# Patient Record
Sex: FEMALE | Race: WHITE | NOT HISPANIC OR LATINO | Employment: UNEMPLOYED | URBAN - NONMETROPOLITAN AREA
[De-identification: names, ages, dates, MRNs, and addresses within clinical notes are randomized per-mention and may not be internally consistent; named-entity substitution may affect disease eponyms.]

---

## 2021-01-09 ENCOUNTER — HOSPITAL ENCOUNTER (EMERGENCY)
Facility: OTHER | Age: 31
Discharge: HOME | End: 2021-01-09
Attending: STUDENT IN AN ORGANIZED HEALTH CARE EDUCATION/TRAINING PROGRAM | Admitting: STUDENT IN AN ORGANIZED HEALTH CARE EDUCATION/TRAINING PROGRAM
Payer: COMMERCIAL

## 2021-01-09 ENCOUNTER — APPOINTMENT (OUTPATIENT)
Dept: CT IMAGING | Facility: OTHER | Age: 31
End: 2021-01-09
Attending: STUDENT IN AN ORGANIZED HEALTH CARE EDUCATION/TRAINING PROGRAM
Payer: COMMERCIAL

## 2021-01-09 VITALS
HEART RATE: 78 BPM | DIASTOLIC BLOOD PRESSURE: 82 MMHG | WEIGHT: 165.4 LBS | HEIGHT: 67 IN | BODY MASS INDEX: 25.96 KG/M2 | OXYGEN SATURATION: 99 % | TEMPERATURE: 98.5 F | SYSTOLIC BLOOD PRESSURE: 127 MMHG | RESPIRATION RATE: 18 BRPM

## 2021-01-09 DIAGNOSIS — K04.7 DENTAL INFECTION: ICD-10-CM

## 2021-01-09 DIAGNOSIS — R22.0 FACIAL SWELLING: ICD-10-CM

## 2021-01-09 LAB
ANION GAP SERPL CALCULATED.3IONS-SCNC: 5 MMOL/L (ref 3–14)
BASOPHILS # BLD AUTO: 0 10E9/L (ref 0–0.2)
BASOPHILS NFR BLD AUTO: 0.4 %
BUN SERPL-MCNC: 13 MG/DL (ref 7–25)
CALCIUM SERPL-MCNC: 9 MG/DL (ref 8.6–10.3)
CHLORIDE SERPL-SCNC: 103 MMOL/L (ref 98–107)
CO2 SERPL-SCNC: 27 MMOL/L (ref 21–31)
CREAT SERPL-MCNC: 0.63 MG/DL (ref 0.6–1.2)
CRP SERPL-MCNC: 12.3 MG/L
DIFFERENTIAL METHOD BLD: NORMAL
EOSINOPHIL # BLD AUTO: 0.1 10E9/L (ref 0–0.7)
EOSINOPHIL NFR BLD AUTO: 0.9 %
ERYTHROCYTE [DISTWIDTH] IN BLOOD BY AUTOMATED COUNT: 12.3 % (ref 10–15)
GFR SERPL CREATININE-BSD FRML MDRD: >90 ML/MIN/{1.73_M2}
GLUCOSE SERPL-MCNC: 103 MG/DL (ref 70–105)
HCT VFR BLD AUTO: 39.2 % (ref 35–47)
HGB BLD-MCNC: 12.8 G/DL (ref 11.7–15.7)
IMM GRANULOCYTES # BLD: 0 10E9/L (ref 0–0.4)
IMM GRANULOCYTES NFR BLD: 0.3 %
LYMPHOCYTES # BLD AUTO: 2.1 10E9/L (ref 0.8–5.3)
LYMPHOCYTES NFR BLD AUTO: 29.3 %
MCH RBC QN AUTO: 28.6 PG (ref 26.5–33)
MCHC RBC AUTO-ENTMCNC: 32.7 G/DL (ref 31.5–36.5)
MCV RBC AUTO: 88 FL (ref 78–100)
MONOCYTES # BLD AUTO: 0.5 10E9/L (ref 0–1.3)
MONOCYTES NFR BLD AUTO: 7.4 %
NEUTROPHILS # BLD AUTO: 4.3 10E9/L (ref 1.6–8.3)
NEUTROPHILS NFR BLD AUTO: 61.7 %
PLATELET # BLD AUTO: 198 10E9/L (ref 150–450)
POTASSIUM SERPL-SCNC: 4.1 MMOL/L (ref 3.5–5.1)
RBC # BLD AUTO: 4.47 10E12/L (ref 3.8–5.2)
SODIUM SERPL-SCNC: 135 MMOL/L (ref 134–144)
WBC # BLD AUTO: 7 10E9/L (ref 4–11)

## 2021-01-09 PROCEDURE — 86140 C-REACTIVE PROTEIN: CPT | Performed by: STUDENT IN AN ORGANIZED HEALTH CARE EDUCATION/TRAINING PROGRAM

## 2021-01-09 PROCEDURE — 36415 COLL VENOUS BLD VENIPUNCTURE: CPT | Performed by: STUDENT IN AN ORGANIZED HEALTH CARE EDUCATION/TRAINING PROGRAM

## 2021-01-09 PROCEDURE — 99285 EMERGENCY DEPT VISIT HI MDM: CPT | Mod: 25 | Performed by: STUDENT IN AN ORGANIZED HEALTH CARE EDUCATION/TRAINING PROGRAM

## 2021-01-09 PROCEDURE — 70487 CT MAXILLOFACIAL W/DYE: CPT

## 2021-01-09 PROCEDURE — 85025 COMPLETE CBC W/AUTO DIFF WBC: CPT | Performed by: STUDENT IN AN ORGANIZED HEALTH CARE EDUCATION/TRAINING PROGRAM

## 2021-01-09 PROCEDURE — 250N000013 HC RX MED GY IP 250 OP 250 PS 637: Performed by: STUDENT IN AN ORGANIZED HEALTH CARE EDUCATION/TRAINING PROGRAM

## 2021-01-09 PROCEDURE — 99283 EMERGENCY DEPT VISIT LOW MDM: CPT | Performed by: STUDENT IN AN ORGANIZED HEALTH CARE EDUCATION/TRAINING PROGRAM

## 2021-01-09 PROCEDURE — 255N000002 HC RX 255 OP 636: Performed by: STUDENT IN AN ORGANIZED HEALTH CARE EDUCATION/TRAINING PROGRAM

## 2021-01-09 PROCEDURE — 80048 BASIC METABOLIC PNL TOTAL CA: CPT | Performed by: STUDENT IN AN ORGANIZED HEALTH CARE EDUCATION/TRAINING PROGRAM

## 2021-01-09 RX ORDER — PENICILLIN V POTASSIUM 500 MG/1
500 TABLET, FILM COATED ORAL 4 TIMES DAILY
Qty: 40 TABLET | Refills: 0 | Status: SHIPPED | OUTPATIENT
Start: 2021-01-09 | End: 2021-01-19

## 2021-01-09 RX ORDER — ACETAMINOPHEN 325 MG/1
975 TABLET ORAL ONCE
Status: COMPLETED | OUTPATIENT
Start: 2021-01-09 | End: 2021-01-09

## 2021-01-09 RX ORDER — IBUPROFEN 400 MG/1
400 TABLET, FILM COATED ORAL ONCE
Status: COMPLETED | OUTPATIENT
Start: 2021-01-09 | End: 2021-01-09

## 2021-01-09 RX ADMIN — IBUPROFEN 400 MG: 200 TABLET, FILM COATED ORAL at 10:45

## 2021-01-09 RX ADMIN — IOHEXOL 100 ML: 350 INJECTION, SOLUTION INTRAVENOUS at 11:36

## 2021-01-09 RX ADMIN — ACETAMINOPHEN 975 MG: 325 TABLET, FILM COATED ORAL at 10:46

## 2021-01-09 ASSESSMENT — MIFFLIN-ST. JEOR: SCORE: 1507.88

## 2021-01-09 NOTE — ED AVS SNAPSHOT
[facility]  [address]  [place] [address]  Phone: [phone]  Fax: [phone]                                    Genet Jaimes   MRN: [id number]    Department: [facility]   Date of Visit: 1/9/2021           After Visit Summary Signature Page    I have received my discharge instructions, and my questions have been answered. I have discussed any challenges I see with this plan with the nurse or doctor.    ..........................................................................................................................................  Patient/Patient Representative Signature      ..........................................................................................................................................  Patient Representative Print Name and Relationship to Patient    ..................................................               ................................................  Date                                   Time    ..........................................................................................................................................  Reviewed by Signature/Title    ...................................................              ..............................................  Date                                               Time          22EPIC Rev 08/18

## 2021-01-09 NOTE — ED TRIAGE NOTES
Pt presents to the ED ambulatory with a chief complaint of left mouth pain. Pt had a root canal on the left side and had a temporary filling placed in March. Pt's left side of face is swollen. Pt says it is not painful at this time but if she opens her mouth wide it is painful.  Pt did take Ibuprofen yesterday and helped decrease pain. Pt says she has noticed drainage from the site.

## 2021-01-09 NOTE — ED PROVIDER NOTES
"  History     Chief Complaint   Patient presents with     Dental Pain     HPI  Genet Jaimes is a 30 year old female with history of root canal of tooth #16 (left upper 2nd from posterior molar) in 2020 who presents with worsening left upper tooth pain over the past 2-3 days and now facial swelling of her left maxillary face starting 1-2 days ago. She reports possible purulent drainage yesterday but none today. Pain worse with opening her mouth wide, otherwise tolerable. No fevers/chills, nausea/vomiting. No pain with looking side to side or up/down. Pain extends near her anterior left ear and near her jaw but no to her neck. She denies difficulty breathing or sore throat or pain with swallowing. She does not have an appointment to see a dentist at this time.    Allergies:  Allergies   Allergen Reactions     Red Dye Itching       Problem List:    There are no active problems to display for this patient.       Past Medical History:    History reviewed. No pertinent past medical history.    Past Surgical History:    History reviewed. No pertinent surgical history.    Family History:    History reviewed. No pertinent family history.    Social History:  Marital Status:   [2]  Social History     Tobacco Use     Smoking status: Former Smoker     Quit date: 2016     Years since quittin.0     Smokeless tobacco: Never Used   Substance Use Topics     Alcohol use: None     Drug use: None        Medications:         penicillin V (VEETID) 500 MG tablet          Review of Systems  10-point ROS complete and negative other than as noted in HPI above.    Physical Exam   BP: 134/80  Pulse: 87  Temp: 98.5  F (36.9  C)  Resp: 18  Height: 170.2 cm (5' 7\")  Weight: 75 kg (165 lb 6.4 oz)  SpO2: 100 %      Physical Exam  Gen: Lying in bed, no acute distress, alert  HEENT: NC/AT. Soft tissue swelling without overlying erythema to left midface, no fluctuance. TTP to tooth #16 in left upper mouth, mild gingival " irritation, no purulent or other drainage.  Oropharynx clear, no TTP or swelling about the neck or jaw or floor of the mouth  CV: RRR, appears warm and well-perfused  Pulm: CTAB, normal respiratory effort  MSK: No gross deformities or swelling  Skin: No erythema, rash, or other lesions  Neuro: A&O x4, no focal deficits, CN II-XII grossly intact, EOMI without diplopia, PERRL    ED Course     ED Course as of Yared 10 0800   Sat Jan 09, 2021   1019 Patient evaluated, plan for tylenol and ibuprofen, facial CT      1050 CBC unremarkable, normal white count      1100 BMP unremarkable and CRP minimally elevated to 12.3      1205 CT with no abscess, soft tissue swelling. Plan for discharge with penicillin and close outpatient dental follow-up, careful return precautions      1219 Patient re-evaluated, discussed results and plan for antibiotic and discharge with close dental follow-up        Procedures               Critical Care time:  none               Results for orders placed or performed during the hospital encounter of 01/09/21 (from the past 24 hour(s))   CBC with platelets differential   Result Value Ref Range    WBC 7.0 4.0 - 11.0 10e9/L    RBC Count 4.47 3.8 - 5.2 10e12/L    Hemoglobin 12.8 11.7 - 15.7 g/dL    Hematocrit 39.2 35.0 - 47.0 %    MCV 88 78 - 100 fl    MCH 28.6 26.5 - 33.0 pg    MCHC 32.7 31.5 - 36.5 g/dL    RDW 12.3 10.0 - 15.0 %    Platelet Count 198 150 - 450 10e9/L    Diff Method Automated Method     % Neutrophils 61.7 %    % Lymphocytes 29.3 %    % Monocytes 7.4 %    % Eosinophils 0.9 %    % Basophils 0.4 %    % Immature Granulocytes 0.3 %    Absolute Neutrophil 4.3 1.6 - 8.3 10e9/L    Absolute Lymphocytes 2.1 0.8 - 5.3 10e9/L    Absolute Monocytes 0.5 0.0 - 1.3 10e9/L    Absolute Eosinophils 0.1 0.0 - 0.7 10e9/L    Absolute Basophils 0.0 0.0 - 0.2 10e9/L    Abs Immature Granulocytes 0.0 0 - 0.4 10e9/L   Basic metabolic panel   Result Value Ref Range    Sodium 135 134 - 144 mmol/L    Potassium 4.1  3.5 - 5.1 mmol/L    Chloride 103 98 - 107 mmol/L    Carbon Dioxide 27 21 - 31 mmol/L    Anion Gap 5 3 - 14 mmol/L    Glucose 103 70 - 105 mg/dL    Urea Nitrogen 13 7 - 25 mg/dL    Creatinine 0.63 0.60 - 1.20 mg/dL    GFR Estimate >90 >60 mL/min/[1.73_m2]    GFR Estimate If Black >90 >60 mL/min/[1.73_m2]    Calcium 9.0 8.6 - 10.3 mg/dL   CRP inflammation   Result Value Ref Range    CRP Inflammation 12.3 (H) <10.0 mg/L   CT Facial Bones with Contrast    Narrative    PROCEDURE: CT FACIAL BONES WITH CONTRAST 1/9/2021 11:37 AM    HISTORY: Mass, lump or swelling, maxface; left midface swelling,  concern for abscess    COMPARISONS: None.    Meds/Dose Given: omnipaque 350 100 ml    TECHNIQUE: CT scan of the facial bones with IV contrast. Sagittal  coronal reconstructions were obtained    FINDINGS: The mandible is intact. No dental abscesses a root canal as  been performed on a maxillary molar on the left. There is moderate  mucosal thickening seen in the left maxillary sinus. The right  maxillary both ethmoids both sphenoid and frontal sinuses are clear.  The globes extraocular muscles and optic nerves appear normal. The  bony orbits are intact. The nasal bones and nasal septum appear  normal. The turbinates are normal. Pterygoid plates appear normal.    There is soft tissue swelling seen in the left cheek. No soft tissue  abscess is seen.         Impression    IMPRESSION: Soft tissue swelling in the left cheek no soft tissue  abscess is seen. Moderate mucosal thickening seen in the left  maxillary sinus    FOZIA BRAVO MD       Medications   acetaminophen (TYLENOL) tablet 975 mg (975 mg Oral Given 1/9/21 1046)   ibuprofen (ADVIL/MOTRIN) tablet 400 mg (400 mg Oral Given 1/9/21 1045)   iohexol (OMNIPAQUE) 350 mg/mL solution 100 mL (100 mLs Intravenous Given 1/9/21 1136)       Assessments & Plan (with Medical Decision Making)   30-year-old woman with history of root canal of tooth #16 in March 2020 who presents with left  midface swelling and pain of tooth #16 starting within the last 2-3 days, but primarily worsening swelling since yesterday. Vitally stable, afebrile here. DDx includes dental abscess vs infection, dental caries, less likely cellulitis, erysipelas, necrotizing fasciitis. Examined as above, lower suspicion for drainable abscess but given degree of midface swelling a CT of the face was obtained to rule this out; soft tissue swelling but no identifiable facial swelling noted. No concern for posterior or neck extension thus CT neck not indicated. Her CRP was minimally elevated, white count normal without left-shift, making significant bacterial infection less likely. She received tylenol and ibuprofen for pain with some relief. On recheck I discussed that she likely has a periapical abscess that will need definitive management by a dentist; patient denies any antibiotic allergies so will start penicillin for a 10-day course and discharge with close outpatient dental follow-up as soon as possible in the next 2 days. Tylenol/ibuprofen as needed for pain. Careful ED return precautions provided.    I have reviewed the nursing notes.    I have reviewed the findings, diagnosis, plan and need for follow up with the patient.       Discharge Medication List as of 1/9/2021 12:37 PM      START taking these medications    Details   penicillin V (VEETID) 500 MG tablet Take 1 tablet (500 mg) by mouth 4 times daily for 10 days, Disp-40 tablet, R-0, E-Prescribe             Final diagnoses:   Dental infection   Facial swelling       1/9/2021   [facility]     [facility] Jason Jo MD  01/10/21 0805

## 2021-01-09 NOTE — DISCHARGE INSTRUCTIONS
Take tylenol and ibuprofen as needed for pain control. These medications can be taken together or alternating; a typical regimen would include tylenol 1000 mg three times daily as needed (not to exceed 3000 mg in 24 hour period) and ibuprofen 400 mg every 6 hours as needed (take ibuprofen 30 minutes after eating to limit stomach upset).    Take penicillin (antibiotic) as prescribed for dental infection; continue taking until gone even if feeling better.    Follow up with your dentist as soon as possible for definitive management.    Come back to the emergency department immediately or call 911 if you develop a fever greater than 101 degrees Fahrenheit, vomiting or new headache, worsening swelling or redness of the face, or are otherwise feeling sicker.

## 2023-02-09 ENCOUNTER — VIRTUAL VISIT (OUTPATIENT)
Dept: OBGYN | Facility: OTHER | Age: 33
End: 2023-02-09
Attending: STUDENT IN AN ORGANIZED HEALTH CARE EDUCATION/TRAINING PROGRAM
Payer: COMMERCIAL

## 2023-02-09 DIAGNOSIS — O36.80X0 ENCOUNTER TO DETERMINE FETAL VIABILITY OF PREGNANCY, SINGLE OR UNSPECIFIED FETUS: Primary | ICD-10-CM

## 2023-02-09 PROCEDURE — 99207 PR OB VISIT-NO CHARGE - GICH ONLY: CPT

## 2023-02-09 RX ORDER — PRENATAL VIT/IRON FUM/FOLIC AC 27MG-0.8MG
1 TABLET ORAL DAILY
COMMUNITY

## 2023-02-09 ASSESSMENT — PATIENT HEALTH QUESTIONNAIRE - PHQ9: SUM OF ALL RESPONSES TO PHQ QUESTIONS 1-9: 6

## 2023-02-09 NOTE — PROGRESS NOTES
Verbal consent obtained for telephone visit. Total length of call: 27 min    HPI:    This is a 32 year old female patient,  who was called today for OB Intake visit. Patient reports positive pregnancy test at home.     Obstetrical history and OB Demographics updated to the best of this nurse's ability based on patient report. PHQ-9 depression screening and routine Domestic Abuse screening completed. All immediate questions and concerns answered.    FOOD SECURITY SCREENING QUESTIONS  Hunger Vital Signs:  Within the past 12 months we worried whether our food would run out before we got money to buy more. Never  Within the past 12 months the food we bought just didn't last and we didn't have money to get more. Never    Last menstrual period is reported as Patient's last menstrual period was 2022 (approximate). YVONNE based on LMP is Estimated Date of Delivery: Sep 13, 2023.  Her cycles are irregular.  Her last menstrual period was normal.   Since her LMP, she has experienced  nausea, emesis, fatigue, headache, loss of appetite, urinary urgency and urinary frequency.       OBSTETRIC HISTORY:    OB History    Para Term  AB Living   3 2 2 0 0 2   SAB IAB Ectopic Multiple Live Births   0 0 0 0 2      # Outcome Date GA Lbr Dion/2nd Weight Sex Delivery Anes PTL Lv   3 Current            2 Term 10/17/19   2.948 kg (6 lb 8 oz) M Vag-Spont   BRANT      Name: Chano   1 Term 10/24/11    M Vag-Spont   BRANT      Name: Silvio       Age of first pregnancy: 20  Previous OB Provider: Dr. Albright   Previous Delivering Clinic: [facility]   Release of Records: Patient will have records sent to us.     Current delivery plan: [place]   Preferred OB Provider: J[facility]O  Current Primary Care Provider: Doesn't have one at this time.   Pediatrician: Dr. Sweet    Additional History: Had a reaction to witch hazel in the past and has to be stitched in the past.      Have you travelled during the pregnancy?No  Have your  sexual partner(s) travelled during the pregnancy?No      HISTORY:   Planned Pregnancy: Yes  Marital Status:   Occupation: Stay at home mom   Living in Household: Spouse and Children    Father of the baby is involved.   Family and father of baby is supportive of current pregnancy.  Past Medical History of Father of Baby:No significant medical history    Past History:  Her past medical history No past medical history on file..      Her past surgical history: No past surgical history on file.    Since her last LMP she admits to the use of Alcohol .    Pap smear history: Patient is due. Last pap was in 2019 per patient.     STD/STI history: No STD history    STD/STI symptoms: discharge     Past medical, surgical, social and family history were reviewed and updated in EPIC.    Medications reviewed by this nurse. Current medication list:  Current Outpatient Medications   Medication Sig Dispense Refill     Cyanocobalamin (B-12) 1000 MCG TBCR        Prenatal Vit-Fe Fumarate-FA (PRENATAL MULTIVITAMIN W/IRON) 27-0.8 MG tablet Take 1 tablet by mouth daily       The following medications were recommended to be discontinued due to Pregnancy Category D status: None  Patient informed to contact her primary care provider as soon as possible to discuss a safer alternative.    Risk factors:  Moderate and moderately severe risks (consult with OB/Gyn)  Previous fetal or  demise: No  History of  delivery: No  History of heart disease Class I: No  Severe anemia, unresponsive to iron therapy: No  Pelvic mass or neoplasm: No  Previous : No  Hyper/hypothyroidism: No  History of postpartum hemorrhage requiring transfusion:No  History of Placenta Accreta: No    High Risk (Pregnancy managed by OB/Gyn)  Multiple pregnancy: No  Pre-gestational diabetes: No  Chronic Hypertension: No  Renal Failure: Yes  Heart disease, class II or greater: No  Rh Isoimmunization: No  Chronic active hepatitis: No  Convulsive disorder,  poorly controlled: No  Isoimmune thrombocytopenia: No  Pre-term premature rupture of membranes: No  Lupus or other autoimmune disorder: No  Human Immunodeficiency Virus: No      ASSESSMENT/PLAN:       ICD-10-CM    1. Encounter to determine fetal viability of pregnancy, single or unspecified fetus  O36.80X0  OB <14 Weeks w Transvaginal Single          32 year old , 9w1d of pregnancy with YVONNE of 2023, by Last Menstrual Period    Per standing orders and scope of practice of this nurse, patient will have the following orders placed and completed prior to initial OB visit with the appropriate provider:    --early ultrasound for dating and viability ordered for 6+ weeks gestation based on LMP    --Quantitative Beta HCG and progesterone monitoring if indicated    Counseling given:     - Recommended weight gain for pregnancy: 15-25 lbs.   BMI < 18.5  28-40 lbs   18.5 - 24.9 25-35   25 - 29.9 15-25   > 30  < 15       PLAN/PATIENT INSTRUCTIONS:    Normal exercise.  Normal sexual activity.  Prenatal vitamins.  Anticipated weight gain.    follow-up appointment with Dr. Bello for pre-selma care and take multivitamin or pre-selma vitamins    Michaelle Huang RN.................................................. 2023 8:07 AM

## 2023-02-22 LAB
ABO/RH(D): NORMAL
ANTIBODY SCREEN: NEGATIVE
SPECIMEN EXPIRATION DATE: NORMAL

## 2023-02-23 ENCOUNTER — HOSPITAL ENCOUNTER (OUTPATIENT)
Dept: ULTRASOUND IMAGING | Facility: OTHER | Age: 33
Discharge: HOME | End: 2023-02-23
Attending: STUDENT IN AN ORGANIZED HEALTH CARE EDUCATION/TRAINING PROGRAM
Payer: COMMERCIAL

## 2023-02-23 ENCOUNTER — PRENATAL OFFICE VISIT (OUTPATIENT)
Dept: OBGYN | Facility: OTHER | Age: 33
End: 2023-02-23
Attending: STUDENT IN AN ORGANIZED HEALTH CARE EDUCATION/TRAINING PROGRAM
Payer: COMMERCIAL

## 2023-02-23 VITALS
HEART RATE: 79 BPM | SYSTOLIC BLOOD PRESSURE: 110 MMHG | OXYGEN SATURATION: 99 % | WEIGHT: 166.5 LBS | BODY MASS INDEX: 26.13 KG/M2 | RESPIRATION RATE: 16 BRPM | HEIGHT: 67 IN | DIASTOLIC BLOOD PRESSURE: 60 MMHG

## 2023-02-23 DIAGNOSIS — Z34.91 ENCOUNTER FOR PREGNANCY RELATED EXAMINATION IN FIRST TRIMESTER: Primary | ICD-10-CM

## 2023-02-23 DIAGNOSIS — O36.80X0 ENCOUNTER TO DETERMINE FETAL VIABILITY OF PREGNANCY, SINGLE OR UNSPECIFIED FETUS: ICD-10-CM

## 2023-02-23 DIAGNOSIS — Z34.92 SECOND TRIMESTER PREGNANCY: ICD-10-CM

## 2023-02-23 DIAGNOSIS — O21.9 NAUSEA AND VOMITING IN PREGNANCY: ICD-10-CM

## 2023-02-23 LAB
BASOPHILS # BLD AUTO: 0 10E3/UL (ref 0–0.2)
BASOPHILS NFR BLD AUTO: 0 %
C TRACH DNA SPEC QL PROBE+SIG AMP: NEGATIVE
EOSINOPHIL # BLD AUTO: 0 10E3/UL (ref 0–0.7)
EOSINOPHIL NFR BLD AUTO: 0 %
ERYTHROCYTE [DISTWIDTH] IN BLOOD BY AUTOMATED COUNT: 11.7 % (ref 10–15)
HCT VFR BLD AUTO: 35 % (ref 35–47)
HGB BLD-MCNC: 12.3 G/DL (ref 11.7–15.7)
IMM GRANULOCYTES # BLD: 0.1 10E3/UL
IMM GRANULOCYTES NFR BLD: 1 %
LYMPHOCYTES # BLD AUTO: 2.5 10E3/UL (ref 0.8–5.3)
LYMPHOCYTES NFR BLD AUTO: 26 %
MCH RBC QN AUTO: 29.9 PG (ref 26.5–33)
MCHC RBC AUTO-ENTMCNC: 35.1 G/DL (ref 31.5–36.5)
MCV RBC AUTO: 85 FL (ref 78–100)
MONOCYTES # BLD AUTO: 0.4 10E3/UL (ref 0–1.3)
MONOCYTES NFR BLD AUTO: 4 %
N GONORRHOEA DNA SPEC QL NAA+PROBE: NEGATIVE
NEUTROPHILS # BLD AUTO: 6.7 10E3/UL (ref 1.6–8.3)
NEUTROPHILS NFR BLD AUTO: 69 %
NRBC # BLD AUTO: 0 10E3/UL
NRBC BLD AUTO-RTO: 0 /100
PLATELET # BLD AUTO: 208 10E3/UL (ref 150–450)
RBC # BLD AUTO: 4.12 10E6/UL (ref 3.8–5.2)
WBC # BLD AUTO: 9.8 10E3/UL (ref 4–11)

## 2023-02-23 PROCEDURE — G0123 SCREEN CERV/VAG THIN LAYER: HCPCS | Performed by: STUDENT IN AN ORGANIZED HEALTH CARE EDUCATION/TRAINING PROGRAM

## 2023-02-23 PROCEDURE — 87591 N.GONORRHOEAE DNA AMP PROB: CPT | Mod: ZL | Performed by: STUDENT IN AN ORGANIZED HEALTH CARE EDUCATION/TRAINING PROGRAM

## 2023-02-23 PROCEDURE — 86901 BLOOD TYPING SEROLOGIC RH(D): CPT | Mod: ZL | Performed by: STUDENT IN AN ORGANIZED HEALTH CARE EDUCATION/TRAINING PROGRAM

## 2023-02-23 PROCEDURE — 85025 COMPLETE CBC W/AUTO DIFF WBC: CPT | Mod: ZL | Performed by: STUDENT IN AN ORGANIZED HEALTH CARE EDUCATION/TRAINING PROGRAM

## 2023-02-23 PROCEDURE — 76801 OB US < 14 WKS SINGLE FETUS: CPT

## 2023-02-23 PROCEDURE — 87624 HPV HI-RISK TYP POOLED RSLT: CPT | Mod: ZL | Performed by: STUDENT IN AN ORGANIZED HEALTH CARE EDUCATION/TRAINING PROGRAM

## 2023-02-23 PROCEDURE — 87491 CHLMYD TRACH DNA AMP PROBE: CPT | Mod: ZL | Performed by: STUDENT IN AN ORGANIZED HEALTH CARE EDUCATION/TRAINING PROGRAM

## 2023-02-23 PROCEDURE — 86850 RBC ANTIBODY SCREEN: CPT | Mod: ZL | Performed by: STUDENT IN AN ORGANIZED HEALTH CARE EDUCATION/TRAINING PROGRAM

## 2023-02-23 PROCEDURE — 36415 COLL VENOUS BLD VENIPUNCTURE: CPT | Mod: ZL | Performed by: STUDENT IN AN ORGANIZED HEALTH CARE EDUCATION/TRAINING PROGRAM

## 2023-02-23 PROCEDURE — 86762 RUBELLA ANTIBODY: CPT | Mod: ZL | Performed by: STUDENT IN AN ORGANIZED HEALTH CARE EDUCATION/TRAINING PROGRAM

## 2023-02-23 PROCEDURE — 86803 HEPATITIS C AB TEST: CPT | Mod: ZL | Performed by: STUDENT IN AN ORGANIZED HEALTH CARE EDUCATION/TRAINING PROGRAM

## 2023-02-23 PROCEDURE — 99207 PR OB VISIT-NO CHARGE - GICH ONLY: CPT | Performed by: STUDENT IN AN ORGANIZED HEALTH CARE EDUCATION/TRAINING PROGRAM

## 2023-02-23 PROCEDURE — 87389 HIV-1 AG W/HIV-1&-2 AB AG IA: CPT | Mod: ZL | Performed by: STUDENT IN AN ORGANIZED HEALTH CARE EDUCATION/TRAINING PROGRAM

## 2023-02-23 PROCEDURE — 87340 HEPATITIS B SURFACE AG IA: CPT | Mod: ZL | Performed by: STUDENT IN AN ORGANIZED HEALTH CARE EDUCATION/TRAINING PROGRAM

## 2023-02-23 PROCEDURE — 86780 TREPONEMA PALLIDUM: CPT | Mod: ZL | Performed by: STUDENT IN AN ORGANIZED HEALTH CARE EDUCATION/TRAINING PROGRAM

## 2023-02-23 RX ORDER — METOCLOPRAMIDE 10 MG/1
10 TABLET ORAL 2 TIMES DAILY PRN
Qty: 40 TABLET | Refills: 0 | Status: ON HOLD | OUTPATIENT
Start: 2023-02-23 | End: 2023-09-20

## 2023-02-23 ASSESSMENT — PAIN SCALES - GENERAL: PAINLEVEL: NO PAIN (0)

## 2023-02-23 NOTE — NURSING NOTE
"Chief Complaint   Patient presents with     Prenatal Care     OB physical         Initial /60   Pulse 79   Resp 16   Ht 1.689 m (5' 6.5\")   Wt 75.5 kg (166 lb 8 oz)   LMP 12/07/2022 (Approximate)   SpO2 99%   Breastfeeding No   BMI 26.47 kg/m   Estimated body mass index is 26.47 kg/m  as calculated from the following:    Height as of this encounter: 1.689 m (5' 6.5\").    Weight as of this encounter: 75.5 kg (166 lb 8 oz).         Norma J. Gosselin, LPN   "

## 2023-02-23 NOTE — PROGRESS NOTES
New OB Visit    Chief Complaint: Establish care for a new pregnancy    History of Present Illness:  Ms. Genet Jaimes is a 32 year old yo  here today for a new OB visit. She reports her LMP as Patient's last menstrual period was 2022 (approximate). She is sure of this date. She denies early pregnancy symptoms. She IS taking prenatal vitamins at this time. We discussed some warning signs to be alert for that could suggest spontaneous miscarriage including vaginal bleeding, cramping as well as what symptoms should prompt medical evaluation in clinic or the ER.     We also discussed genetic screening including non-invasive testing, carrier screening for SMA and CF. All of these tests were offered to the family and they have decided on NIPT and carrier screening at this time.     Medical History:  History reviewed. No pertinent past medical history.  She denies any chronic medical conditions: specifically denies asthma, HTN, DM    Obstetric History:     x2  Silvio- 8 lb 6.5 oz, , borderline gDM  Chano- 6 lb 6 oz,     Pelvis proven to 8lb 6 oz    GYN History:  No history of STIs  Last pap smear: Three years ago   No history of abnormal pap smears    Past Surgical History:  Past Surgical History:   Procedure Laterality Date     Foot deformity       Family Medical History:  Family History   Problem Relation Age of Onset     Diabetes Type 1 Father      Autism Spectrum Disorder Son       Sloane's father was recently had a brain aneurysm    Specifically denies breast, ovarian, endometrial and colon cancers in her family  She also is not aware of any familial thrombophilias and coagulopathies in her family    Medications:  Current Outpatient Medications   Medication     Cyanocobalamin (B-12) 1000 MCG TBCR     Prenatal Vit-Fe Fumarate-FA (PRENATAL MULTIVITAMIN W/IRON) 27-0.8 MG tablet     No current facility-administered medications for this visit.       Allergies:     Allergies   Allergen  "Reactions     Red Dye Itching     Social History:  Social History     Tobacco Use     Smoking status: Former     Types: Cigarettes     Quit date: 2016     Years since quittin.1     Smokeless tobacco: Never   Vaping Use     Vaping Use: Former     Substances: Nicotine, Flavoring     Devices: Disposable   Substance Use Topics     Alcohol use: Not Currently     Drug use: Never     She lives at home with her  and children  No tobacco, alcohol or drug use in this pregnancy    ROS:   Skin: negative for rash, bruising  Eyes: negative for visual blurring, double vision  Ears/Nose/Throat: negative for nasal congestion, vertigo  Respiratory: No shortness of breath, dyspnea on exertion, cough, or hemoptysis  Cardiovascular: negative for palpitations, chest pain, lower extremity edema and syncope or near-syncope  Gastrointestinal: negative for, nausea, vomiting and hematemesis  Genitourinary: negative for, dysuria, frequency and urgency  Musculoskeletal: negative for, back pain and muscular weakness  Neurologic: negative for, headaches, syncope, seizures and local weakness  Psychiatric: negative for, anxiety, depression and hallucinations  Hematologic/Lymphatic/Immunologic: negative for, anemia, chills and fever      Exam:  /60   Pulse 79   Resp 16   Ht 1.689 m (5' 6.5\")   Wt 75.5 kg (166 lb 8 oz)   LMP 2022 (Approximate)   SpO2 99%   Breastfeeding No   BMI 26.47 kg/m    General: No acute distress, well-appearing  Neck: Normal thyroid gland on palpation without nodules, enlargement or pain  Breast: Normal appearing skin on breasts bilaterally, No nodules or masses palpated, no nipple discharge or bleeding  Cardiac: Normal rate, regular rhythm, no murmurs, gallops or rubs, normal perfusion to extremities  Lungs: Clear on auscultation, no wheezing, non-labored respirations  Abdomen: Soft, non-tender, no masses  Pelvic exam: Normal appearing external genitalia, normal appearing vaginal walls with " pink ruggae. No noted vaginal discharge or lesions. Cervix is closed without masses, nodules, erythema or discharge at the os.     Dating ultrasound: Done    YVONNE based on LMP 2023- YVONNE based on US 2023. Per ACOG recommendations these dates are not more than 7 days different, thus should not be changed to be based off the US. Final YVONNE will be 2023 based on LMP=11 week US.    Assessment:  Ms. Genet Jaimes is a 32 year old yo  here today to establish care for this pregnancy. Her pregnancy is uncomplicated.    Plan:  # Routine Prenatal Care  -- Dating: LMP=11 week US YVONNE: 2023  -- PNLs: collected today including the following   CBC   RPR   Hep B S Ag   Hep C   Rubella   HIV   ABO/Rh type   Antibody Screen    -- Genetic Screening: Discussed with patient, she has decided on NIPT and carrier screening.  -- Anatomy US: Planned for approximately 20 weeks gestation. Currently no indication for Level II  -- Immunizations: Plans for Tdap at approximately 27 weeks gestation  -- 3rd TM labs including CBC, RPR: Planned for after 27 weeks gestation  -- 1 hr GTT: Planned for  24-28 weeks   -- GBS: Planned for 36 weeks  -- Postpartum Planning: To be discussed   -- Delivery Planning: No indication for early IOL at this time. To be discussed continually. Plans for epidural- had trouble with first epidural  -- Return to clinic in 4 weeks for OB follow up visit  -- Planning to do at next visit: Follow up labs    # Plan for anesthesia consult 32-34 weeks  -- epdiural did not wear off for 3 days after first baby      Socorro Bello MD  OB/GYN  2023 1:23 PM

## 2023-02-24 LAB
HBV SURFACE AG SERPL QL IA: NONREACTIVE
HCV AB SERPL QL IA: NONREACTIVE
HIV 1+2 AB+HIV1 P24 AG SERPL QL IA: NONREACTIVE

## 2023-02-25 LAB — T PALLIDUM AB SER QL: NONREACTIVE

## 2023-02-26 LAB
RUBV IGG SERPL QL IA: 1.22 INDEX
RUBV IGG SERPL QL IA: POSITIVE

## 2023-03-02 LAB
BKR LAB AP GYN ADEQUACY: NORMAL
BKR LAB AP GYN INTERPRETATION: NORMAL
BKR LAB AP HPV REFLEX: NORMAL
BKR LAB AP PREVIOUS ABNORMAL: NORMAL
PATH REPORT.COMMENTS IMP SPEC: NORMAL
PATH REPORT.COMMENTS IMP SPEC: NORMAL
PATH REPORT.RELEVANT HX SPEC: NORMAL

## 2023-03-03 LAB — SCANNED LAB RESULT: NORMAL

## 2023-03-07 LAB
HUMAN PAPILLOMA VIRUS 16 DNA: NEGATIVE
HUMAN PAPILLOMA VIRUS 18 DNA: NEGATIVE
HUMAN PAPILLOMA VIRUS FINAL DIAGNOSIS: NORMAL
HUMAN PAPILLOMA VIRUS OTHER HR: NEGATIVE

## 2023-03-08 ENCOUNTER — TELEPHONE (OUTPATIENT)
Dept: LAB | Facility: OTHER | Age: 33
End: 2023-03-08
Payer: COMMERCIAL

## 2023-03-08 LAB — SCANNED LAB RESULT: NORMAL

## 2023-03-23 ENCOUNTER — PRENATAL OFFICE VISIT (OUTPATIENT)
Dept: OBGYN | Facility: OTHER | Age: 33
End: 2023-03-23
Attending: STUDENT IN AN ORGANIZED HEALTH CARE EDUCATION/TRAINING PROGRAM
Payer: COMMERCIAL

## 2023-03-23 ENCOUNTER — TELEPHONE (OUTPATIENT)
Dept: ALLERGY | Facility: CLINIC | Age: 33
End: 2023-03-23

## 2023-03-23 VITALS
WEIGHT: 164 LBS | HEART RATE: 104 BPM | SYSTOLIC BLOOD PRESSURE: 112 MMHG | DIASTOLIC BLOOD PRESSURE: 68 MMHG | BODY MASS INDEX: 26.07 KG/M2

## 2023-03-23 DIAGNOSIS — Z91.018 FOOD ALLERGY: ICD-10-CM

## 2023-03-23 DIAGNOSIS — Z34.92 SECOND TRIMESTER PREGNANCY: Primary | ICD-10-CM

## 2023-03-23 PROCEDURE — 99207 PR OB VISIT-NO CHARGE - GICH ONLY: CPT | Performed by: STUDENT IN AN ORGANIZED HEALTH CARE EDUCATION/TRAINING PROGRAM

## 2023-03-23 NOTE — TELEPHONE ENCOUNTER
[facility] Call Center    Phone Message    May a detailed message be left on voicemail: yes     Reason for Call: Appointment Intake      Referring Provider Name:   Faith Bello MD in  OB/GYN    Diagnosis and/or Symptoms:   Food allergy; 15 weeks pregnant, food sensitivities affecting pregnancy and ability to eat appropriate amounts      Please return Pt's call regarding scheduling - she is wondering if she can do a Virtual Appt - and have labs done locally - please call to discuss options      Thanks!      Action Taken: Message routed to:  Other: [facility]    Travel Screening: Not Applicable

## 2023-03-23 NOTE — PROGRESS NOTES
Return OB Visit    S: Ms. Jaimes is feeling well today. She has no acute concerns. Denies leaking of fluid, vaginal bleeding, painful contractions. Notes fetal movements.    O:   /68   Pulse 104   Wt 74.4 kg (164 lb)   LMP 2022 (Approximate)   BMI 26.07 kg/m      Gen: Well-appearing, NAD     bpm    Assessment:  Ms. Genet Jaimes is a 32 year old yo  here today for OB follow up. She is currently 15w1d. Her pregnancy is uncomplicated.     Plan:  # Routine Prenatal Care  -- Dating: LMP=11 week US YVONNE: 2023  -- PNLs:               A+, Ab screen neg              RPR nr              Hep B S Ag neg              Hep C neg              Rubella immune              HIV neg     -- Genetic Screening: negative carrier screen, low risk XY-- Kaladin Joseph  -- Anatomy US: Planned for approximately 20 weeks gestation. Currently no indication for Level II- ordered  -- Immunizations: Plans for Tdap at approximately 27 weeks gestation  -- 3rd TM labs including CBC, RPR: Planned for after 27 weeks gestation  -- 1 hr GTT: Planned for  24-28 weeks   -- GBS: Planned for 36 weeks  -- Postpartum Planning: To be discussed   -- Delivery Planning: No indication for early IOL at this time. To be discussed continually. Plans for epidural- had trouble with first epidural  -- Return to clinic in 4 weeks for OB follow up visit  -- Planning to do at next visit: Follow up anatomy US     # Plan for anesthesia consult 32-34 weeks  -- epdiural did not wear off for 3 days after first baby    Socorro Bello MD  OB/GYN  3/23/2023 9:45 AM

## 2023-03-24 NOTE — TELEPHONE ENCOUNTER
Called and left voicemail for patient to call back Allergy RN to schedule appointment.    Chaim RUIZ RN, BSN  3/24/2023 10:15 AM

## 2023-03-27 NOTE — TELEPHONE ENCOUNTER
"S: Patient states that pastas, rice and foods described as \"heavy\" cause the patient to have gastric upset.  B: Pt reports that gastric upset has been ongoing prior to pregnancy. Patient is pregnant, states she feels as if her symptoms are amplified with this pregnancy.  A: Foods in question do not produce hives, swelling, vomiting, or diarrhea.  R: Patient scheduled for virtual visit with Dr. Abel. Recommended to patient that she ask her OBGYN for a referral to GI. Patient has no further questions at this time.    Chaim RUIZ RN, BSN.  3/27/2023 10:19 AM   "

## 2023-04-19 ENCOUNTER — PRENATAL OFFICE VISIT (OUTPATIENT)
Dept: OBGYN | Facility: OTHER | Age: 33
End: 2023-04-19
Attending: STUDENT IN AN ORGANIZED HEALTH CARE EDUCATION/TRAINING PROGRAM
Payer: COMMERCIAL

## 2023-04-19 ENCOUNTER — HOSPITAL ENCOUNTER (OUTPATIENT)
Dept: ULTRASOUND IMAGING | Facility: OTHER | Age: 33
Discharge: HOME | End: 2023-04-19
Attending: STUDENT IN AN ORGANIZED HEALTH CARE EDUCATION/TRAINING PROGRAM
Payer: COMMERCIAL

## 2023-04-19 VITALS
HEART RATE: 70 BPM | DIASTOLIC BLOOD PRESSURE: 72 MMHG | SYSTOLIC BLOOD PRESSURE: 116 MMHG | BODY MASS INDEX: 27.2 KG/M2 | WEIGHT: 171.1 LBS

## 2023-04-19 DIAGNOSIS — Z34.92 SECOND TRIMESTER PREGNANCY: ICD-10-CM

## 2023-04-19 DIAGNOSIS — Z34.92 SECOND TRIMESTER PREGNANCY: Primary | ICD-10-CM

## 2023-04-19 PROCEDURE — 76805 OB US >/= 14 WKS SNGL FETUS: CPT

## 2023-04-19 PROCEDURE — 99207 PR OB VISIT-NO CHARGE - GICH ONLY: CPT | Performed by: STUDENT IN AN ORGANIZED HEALTH CARE EDUCATION/TRAINING PROGRAM

## 2023-04-19 NOTE — NURSING NOTE
Pt presents to clinic today for prenatal care 19w0d. Pt denies any contractions, bleeding, or leakage of fluid at this time.      Medication Reconciliation: complete  Angelic Gross LPN

## 2023-04-19 NOTE — PROGRESS NOTES
Return OB Visit    S: Ms. Jaimes is feeling well today. She has no acute concerns. Denies leaking of fluid, vaginal bleeding, painful contractions. Notes fetal movements. She had her anatomy US today.    O: /72   Pulse 70   Wt 77.6 kg (171 lb 1.6 oz)   LMP 2022 (Approximate)   BMI 27.20 kg/m    Gen: Well-appearing, NAD    142 bpm on anatomy US    Assessment:  Ms. Genet Jaimes is a 32 year old yo  here today for OB follow up. She is currently 19w0d. Her pregnancy is uncomplicated.     Plan:  # Routine Prenatal Care  -- Dating: LMP=11 week US YVONNE: 2023  -- PNLs:               A+, Ab screen neg              RPR nr              Hep B S Ag neg              Hep C neg              Rubella immune              HIV neg     -- Genetic Screening: negative carrier screen, low risk XY-- Kaladin Joseph  -- Anatomy US:today, pending  -- Immunizations: Plans for Tdap at approximately 27 weeks gestation  -- 3rd TM labs including CBC, RPR: Planned for after 27 weeks gestation  -- 1 hr GTT: Planned for  24-28 weeks   -- GBS: Planned for 36 weeks  -- Postpartum Planning: To be discussed   -- Delivery Planning: No indication for early IOL at this time. To be discussed continually. Plans for epidural- had trouble with first epidural  -- Return to clinic in 4 weeks for OB follow up visit  -- Planning to do at next visit: Follow up anatomy US     # Plan for anesthesia consult 32-34 weeks  -- epdiural did not wear off for 3 days after first baby    Socorro Bello MD  OB/GYN  2023 9:46 AM

## 2023-05-08 ENCOUNTER — HEALTH MAINTENANCE LETTER (OUTPATIENT)
Age: 33
End: 2023-05-08

## 2023-05-22 ENCOUNTER — PRENATAL OFFICE VISIT (OUTPATIENT)
Dept: OBGYN | Facility: OTHER | Age: 33
End: 2023-05-22
Attending: STUDENT IN AN ORGANIZED HEALTH CARE EDUCATION/TRAINING PROGRAM
Payer: COMMERCIAL

## 2023-05-22 VITALS
DIASTOLIC BLOOD PRESSURE: 76 MMHG | HEART RATE: 86 BPM | SYSTOLIC BLOOD PRESSURE: 112 MMHG | BODY MASS INDEX: 27.98 KG/M2 | WEIGHT: 176 LBS

## 2023-05-22 DIAGNOSIS — Z34.92 SECOND TRIMESTER PREGNANCY: Primary | ICD-10-CM

## 2023-05-22 PROCEDURE — 99207 PR OB VISIT-NO CHARGE - GICH ONLY: CPT | Performed by: STUDENT IN AN ORGANIZED HEALTH CARE EDUCATION/TRAINING PROGRAM

## 2023-05-22 NOTE — NURSING NOTE
Pt presents to clinic today for prenatal care 23w5d. Pt denies any contractions, bleeding, or leakage of fluid at this time. States baby I s moving good.        Medication Reconciliation: complete  Angelic Gross LPN

## 2023-05-22 NOTE — PROGRESS NOTES
Return OB Visit    S: Ms. Jaimes is feeling well today. She has no acute concerns. Denies leaking of fluid, vaginal bleeding, painful contractions. Notes fetal movements.    O:   /76   Pulse 86   Wt 79.8 kg (176 lb)   LMP 2022 (Approximate)   BMI 27.98 kg/m      Gen: Well-appearing, NAD    FH: 25 cm  FHT: 138 bpm    Assessment:  Ms. Genet Jaimes is a 32 year old yo  here today for OB follow up. She is currently 23w5d. Her pregnancy is uncomplicated.     Plan:  # Routine Prenatal Care  -- Dating: LMP=11 week US YVONNE: 2023  -- PNLs:               A+, Ab screen neg              RPR nr              Hep B S Ag neg              Hep C neg              Rubella immune              HIV neg     -- Genetic Screening: negative carrier screen, low risk XY-- Kaladin Joseph  -- Anatomy US: 55%ile normal anatomy  -- Immunizations: Plans for Tdap at approximately 27 weeks gestation  -- 3rd TM labs including CBC, RPR: Planned for after 27 weeks gestation  -- 1 hr GTT: Planned for  24-28 weeks   -- GBS: Planned for 36 weeks  -- Postpartum Planning: To be discussed   -- Delivery Planning: No indication for early IOL at this time. To be discussed continually. Plans for epidural- had trouble with first epidural  -- Return to clinic in 4 weeks for OB follow up visit  -- Planning to do at next visit: GTT     # Plan for anesthesia consult 32-34 weeks  -- epdiural did not wear off for 3 days after first baby    Socorro Bello MD  OB/GYN  2023 12:43 PM

## 2023-06-05 ENCOUNTER — VIRTUAL VISIT (OUTPATIENT)
Dept: ALLERGY | Facility: CLINIC | Age: 33
End: 2023-06-05
Payer: COMMERCIAL

## 2023-06-05 DIAGNOSIS — T78.1XXA ADVERSE FOOD REACTION, INITIAL ENCOUNTER: ICD-10-CM

## 2023-06-05 DIAGNOSIS — R10.84 ABDOMINAL PAIN, GENERALIZED: Primary | ICD-10-CM

## 2023-06-05 PROCEDURE — 99203 OFFICE O/P NEW LOW 30 MIN: CPT | Mod: VID | Performed by: INTERNAL MEDICINE

## 2023-06-05 ASSESSMENT — ENCOUNTER SYMPTOMS
ARTHRALGIAS: 0
FEVER: 0
SHORTNESS OF BREATH: 0
PALPITATIONS: 0
EYE PAIN: 0
COUGH: 0
HEMATURIA: 0
CHILLS: 0
BACK PAIN: 0
COLOR CHANGE: 0
DYSURIA: 0
ABDOMINAL PAIN: 0
SEIZURES: 0
SORE THROAT: 0
VOMITING: 0

## 2023-06-05 NOTE — PROGRESS NOTES
"Genet Jaimes was seen virtually.    Genet Jaimes is a 32 year old female being seen today at the request of Faith Bello MD in consultation for possible gluten allergy.    Cindi for several years has had symptoms when eating gluten products and also beans, potatoes and fried foods.  Symptoms happen within 5 to 30 minutes with a sharp pain which she describes as a \"ball of spikes going through her guts\".  Her stomach also feels heavy.  She has problems with constipation which is a persistent problem rather than related to any specific food.    Her symptoms seem to worsen after her first pregnancy and continue to bother her.  She has not had any evaluation for her GI symptoms.    When switching over to veggie noodles her symptoms improved.  Toasted bread seems to cause more problems than non-toasted.    She does not have any vomiting or hives or rash or itching associated with any of these foods.      Past Medical History:   Diagnosis Date     Postpartum depression      Seizures (H)      Family History   Problem Relation Age of Onset     Diabetes Type 1 Father      Autism Spectrum Disorder Son      Past Surgical History:   Procedure Laterality Date     Foot deformity         ENVIRONMENTAL HISTORY:   Pets inside the house include 4 cat(s) and 4 dog(s).  Do you smoke cigarettes or other recreational drugs? No There is/are 0 smokers living in the house. The house does have a damp basement.     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      Review of Systems   Constitutional: Negative for chills and fever.   HENT: Negative for ear pain and sore throat.    Eyes: Negative for pain and visual disturbance.   Respiratory: Negative for cough and shortness of breath.    Cardiovascular: Negative for chest pain and palpitations.   Gastrointestinal: Negative for abdominal pain and vomiting.   Genitourinary: Negative for dysuria and hematuria.   Musculoskeletal: Negative for " arthralgias and back pain.   Skin: Negative for color change and rash.   Neurological: Negative for seizures and syncope.   All other systems reviewed and are negative.        Current Outpatient Medications:      Cyanocobalamin (B-12) 1000 MCG TBCR, , Disp: , Rfl:      Prenatal Vit-Fe Fumarate-FA (PRENATAL MULTIVITAMIN W/IRON) 27-0.8 MG tablet, Take 1 tablet by mouth daily, Disp: , Rfl:      metoclopramide (REGLAN) 10 MG tablet, Take 1 tablet (10 mg) by mouth 2 times daily as needed (nausea) (Patient not taking: Reported on 6/5/2023), Disp: 40 tablet, Rfl: 0  Allergies   Allergen Reactions     Red Dye Itching         EXAM:   LMP 12/07/2022 (Approximate)     Physical Exam    Constitutional:       General: She is not in acute distress.     Appearance: Normal appearance. She is not ill-appearing.   HENT:      Head: Normocephalic and atraumatic.    Eyes:      General:         Right eye: No discharge.         Left eye: No discharge.   Pulmonary:      Effort: Pulmonary effort is normal.    Skin:     Findings: No erythema or rash.   Psychiatric:         Mood and Affect: Mood normal.         Behavior: Behavior normal.      ASSESSMENT/PLAN:  Genet Jaimes is a 32 year old female seen today for abdominal symptoms with wheat products but also has symptoms with foods such as beans, potatoes and fried foods.  She has significant abdominal pain with these foods.  She has difficulty avoiding these foods.    Her symptoms do not sound classic for an allergy.  With the abdominal symptoms we will check for celiac testing.  She may need a referral to gastroenterology or nutrition depending on those results.    She will be contacted with results of the labs once available.    Thank you for allowing me to participate in the care of Genet Jaimes.      I spent 30 minutes on the date of the encounter doing chart review, history and exam, documentation and further coordination as noted above exclusive of separately reported  interpretations    Brice Abel MD  Allergy/Immunology  [facility] - Kate De León is a 32 year old who is being evaluated via a billable video visit.      How would you like to obtain your AVS? MyChart  If the video visit is dropped, the invitation should be resent by: Text to cell phone: [phone]  Will anyone else be joining your video visit? No        Video-Visit Details    Type of service:  Video Visit     Originating Location (pt. Location): Home    Distant Location (provider location):  On-site  Platform used for Video Visit: AmWell     Time video start 9:11 AM  Time video end 9:22 AM

## 2023-06-05 NOTE — LETTER
"    6/5/2023         RE: Genet Jaimes  [id number] [facility] [id number]        Dear Colleague,    Thank you for referring your patient, Genet Jaimes, to the [facility] [place]. Please see a copy of my visit note below.    Genet Jaimes was seen in the Allergy Clinic at [facility].    Genet Jaimes is a 32 year old female being seen today at the request of Faith Bello MD in consultation for possible gluten allergy.    Cindi for several years has had symptoms when eating gluten products and also beans, potatoes and fried foods.  Symptoms happen within 5 to 30 minutes with a sharp pain which she describes as a \"ball of spikes going through her guts\".  Her stomach also feels heavy.  She has problems with constipation which is a persistent problem rather than related to any specific food.    Her symptoms seem to worsen after her first pregnancy and continue to bother her.  She has not had any evaluation for her GI symptoms.    When switching over to veggie noodles her symptoms improved.  Toasted bread seems to cause more problems than non-toasted.    She does not have any vomiting or hives or rash or itching associated with any of these foods.      Past Medical History:   Diagnosis Date     Postpartum depression      Seizures (H)      Family History   Problem Relation Age of Onset     Diabetes Type 1 Father      Autism Spectrum Disorder Son      Past Surgical History:   Procedure Laterality Date     Foot deformity         ENVIRONMENTAL HISTORY:   Pets inside the house include 4 cat(s) and 4 dog(s).  Do you smoke cigarettes or other recreational drugs? No There is/are 0 smokers living in the house. The house does have a damp basement.     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      Review of Systems   Constitutional: Negative for chills and fever.   HENT: Negative for ear pain and sore throat.    Eyes: " Negative for pain and visual disturbance.   Respiratory: Negative for cough and shortness of breath.    Cardiovascular: Negative for chest pain and palpitations.   Gastrointestinal: Negative for abdominal pain and vomiting.   Genitourinary: Negative for dysuria and hematuria.   Musculoskeletal: Negative for arthralgias and back pain.   Skin: Negative for color change and rash.   Neurological: Negative for seizures and syncope.   All other systems reviewed and are negative.        Current Outpatient Medications:      Cyanocobalamin (B-12) 1000 MCG TBCR, , Disp: , Rfl:      Prenatal Vit-Fe Fumarate-FA (PRENATAL MULTIVITAMIN W/IRON) 27-0.8 MG tablet, Take 1 tablet by mouth daily, Disp: , Rfl:      metoclopramide (REGLAN) 10 MG tablet, Take 1 tablet (10 mg) by mouth 2 times daily as needed (nausea) (Patient not taking: Reported on 6/5/2023), Disp: 40 tablet, Rfl: 0  Allergies   Allergen Reactions     Red Dye Itching         EXAM:   LMP 12/07/2022 (Approximate)     Physical Exam    Constitutional:       General: She is not in acute distress.     Appearance: Normal appearance. She is not ill-appearing.   HENT:      Head: Normocephalic and atraumatic.    Eyes:      General:         Right eye: No discharge.         Left eye: No discharge.   Pulmonary:      Effort: Pulmonary effort is normal.    Skin:     Findings: No erythema or rash.   Psychiatric:         Mood and Affect: Mood normal.         Behavior: Behavior normal.      ASSESSMENT/PLAN:  Genet Jaimes is a 32 year old female seen today for abdominal symptoms with wheat products but also has symptoms with foods such as beans, potatoes and fried foods.  She has significant abdominal pain with these foods.  She has difficulty avoiding these foods.    Her symptoms do not sound classic for an allergy.  With the abdominal symptoms we will check for celiac testing.  She may need a referral to gastroenterology or nutrition depending on those results.    She will be  contacted with results of the labs once available.    Thank you for allowing me to participate in the care of Genet Jaimes.      I spent 30 minutes on the date of the encounter doing chart review, history and exam, documentation and further coordination as noted above exclusive of separately reported interpretations    Brice Abel MD  Allergy/Immunology  [facility] - Kate De León is a 32 year old who is being evaluated via a billable video visit.      How would you like to obtain your AVS? MyChart  If the video visit is dropped, the invitation should be resent by: Text to cell phone: [phone]  Will anyone else be joining your video visit? No        Video-Visit Details    Type of service:  Video Visit     Originating Location (pt. Location): Home    Distant Location (provider location):  On-site  Platform used for Video Visit: AmWell     Time video start 9:11 AM  Time video end 9:22 AM                                                 Again, thank you for allowing me to participate in the care of your patient.        Sincerely,        Brice Abel MD

## 2023-06-06 ENCOUNTER — TELEPHONE (OUTPATIENT)
Dept: ALLERGY | Facility: CLINIC | Age: 33
End: 2023-06-06
Payer: COMMERCIAL

## 2023-06-21 ENCOUNTER — PRENATAL OFFICE VISIT (OUTPATIENT)
Dept: OBGYN | Facility: OTHER | Age: 33
End: 2023-06-21
Attending: STUDENT IN AN ORGANIZED HEALTH CARE EDUCATION/TRAINING PROGRAM
Payer: COMMERCIAL

## 2023-06-21 VITALS
HEART RATE: 86 BPM | DIASTOLIC BLOOD PRESSURE: 70 MMHG | BODY MASS INDEX: 28.27 KG/M2 | SYSTOLIC BLOOD PRESSURE: 116 MMHG | WEIGHT: 177.8 LBS

## 2023-06-21 DIAGNOSIS — Z34.92 SECOND TRIMESTER PREGNANCY: ICD-10-CM

## 2023-06-21 DIAGNOSIS — T78.1XXA ADVERSE FOOD REACTION, INITIAL ENCOUNTER: ICD-10-CM

## 2023-06-21 DIAGNOSIS — O21.9 NAUSEA AND VOMITING IN PREGNANCY: ICD-10-CM

## 2023-06-21 DIAGNOSIS — Z34.93 ENCOUNTER FOR PREGNANCY RELATED EXAMINATION IN THIRD TRIMESTER: Primary | ICD-10-CM

## 2023-06-21 DIAGNOSIS — R10.84 ABDOMINAL PAIN, GENERALIZED: ICD-10-CM

## 2023-06-21 LAB
BASOPHILS # BLD AUTO: 0 10E3/UL (ref 0–0.2)
BASOPHILS NFR BLD AUTO: 0 %
EOSINOPHIL # BLD AUTO: 0.1 10E3/UL (ref 0–0.7)
EOSINOPHIL NFR BLD AUTO: 1 %
ERYTHROCYTE [DISTWIDTH] IN BLOOD BY AUTOMATED COUNT: 12.9 % (ref 10–15)
GLUCOSE 1H P 50 G GLC PO SERPL-MCNC: 109 MG/DL (ref 70–129)
HCT VFR BLD AUTO: 32.4 % (ref 35–47)
HGB BLD-MCNC: 10.9 G/DL (ref 11.7–15.7)
IMM GRANULOCYTES # BLD: 0 10E3/UL
IMM GRANULOCYTES NFR BLD: 0 %
LYMPHOCYTES # BLD AUTO: 2 10E3/UL (ref 0.8–5.3)
LYMPHOCYTES NFR BLD AUTO: 23 %
MCH RBC QN AUTO: 30 PG (ref 26.5–33)
MCHC RBC AUTO-ENTMCNC: 33.6 G/DL (ref 31.5–36.5)
MCV RBC AUTO: 89 FL (ref 78–100)
MONOCYTES # BLD AUTO: 0.4 10E3/UL (ref 0–1.3)
MONOCYTES NFR BLD AUTO: 4 %
NEUTROPHILS # BLD AUTO: 6.6 10E3/UL (ref 1.6–8.3)
NEUTROPHILS NFR BLD AUTO: 72 %
NRBC # BLD AUTO: 0 10E3/UL
NRBC BLD AUTO-RTO: 0 /100
PLATELET # BLD AUTO: 153 10E3/UL (ref 150–450)
RBC # BLD AUTO: 3.63 10E6/UL (ref 3.8–5.2)
WBC # BLD AUTO: 9.1 10E3/UL (ref 4–11)

## 2023-06-21 PROCEDURE — 36415 COLL VENOUS BLD VENIPUNCTURE: CPT | Mod: ZL | Performed by: STUDENT IN AN ORGANIZED HEALTH CARE EDUCATION/TRAINING PROGRAM

## 2023-06-21 PROCEDURE — 82784 ASSAY IGA/IGD/IGG/IGM EACH: CPT | Mod: ZL | Performed by: STUDENT IN AN ORGANIZED HEALTH CARE EDUCATION/TRAINING PROGRAM

## 2023-06-21 PROCEDURE — 86003 ALLG SPEC IGE CRUDE XTRC EA: CPT | Mod: ZL | Performed by: STUDENT IN AN ORGANIZED HEALTH CARE EDUCATION/TRAINING PROGRAM

## 2023-06-21 PROCEDURE — 86780 TREPONEMA PALLIDUM: CPT | Mod: ZL | Performed by: STUDENT IN AN ORGANIZED HEALTH CARE EDUCATION/TRAINING PROGRAM

## 2023-06-21 PROCEDURE — 99207 PR OB VISIT-NO CHARGE - GICH ONLY: CPT | Performed by: STUDENT IN AN ORGANIZED HEALTH CARE EDUCATION/TRAINING PROGRAM

## 2023-06-21 PROCEDURE — 86231 EMA EACH IG CLASS: CPT | Mod: ZL | Performed by: STUDENT IN AN ORGANIZED HEALTH CARE EDUCATION/TRAINING PROGRAM

## 2023-06-21 PROCEDURE — 90471 IMMUNIZATION ADMIN: CPT | Performed by: STUDENT IN AN ORGANIZED HEALTH CARE EDUCATION/TRAINING PROGRAM

## 2023-06-21 PROCEDURE — 86364 TISS TRNSGLTMNASE EA IG CLAS: CPT | Mod: ZL | Performed by: STUDENT IN AN ORGANIZED HEALTH CARE EDUCATION/TRAINING PROGRAM

## 2023-06-21 PROCEDURE — 90715 TDAP VACCINE 7 YRS/> IM: CPT | Performed by: STUDENT IN AN ORGANIZED HEALTH CARE EDUCATION/TRAINING PROGRAM

## 2023-06-21 PROCEDURE — 85025 COMPLETE CBC W/AUTO DIFF WBC: CPT | Mod: ZL | Performed by: STUDENT IN AN ORGANIZED HEALTH CARE EDUCATION/TRAINING PROGRAM

## 2023-06-21 PROCEDURE — 86258 DGP ANTIBODY EACH IG CLASS: CPT | Mod: ZL,91 | Performed by: STUDENT IN AN ORGANIZED HEALTH CARE EDUCATION/TRAINING PROGRAM

## 2023-06-21 PROCEDURE — 82950 GLUCOSE TEST: CPT | Mod: ZL | Performed by: STUDENT IN AN ORGANIZED HEALTH CARE EDUCATION/TRAINING PROGRAM

## 2023-06-21 RX ORDER — FAMOTIDINE 20 MG/1
20 TABLET, FILM COATED ORAL 2 TIMES DAILY PRN
Qty: 60 TABLET | Refills: 0 | Status: SHIPPED | OUTPATIENT
Start: 2023-06-21

## 2023-06-21 RX ORDER — METOCLOPRAMIDE 10 MG/1
10 TABLET ORAL 2 TIMES DAILY PRN
Qty: 20 TABLET | Refills: 0 | Status: SHIPPED | OUTPATIENT
Start: 2023-06-21 | End: 2023-07-10

## 2023-06-21 NOTE — PATIENT INSTRUCTIONS
Kick Counts  It s normal to worry about your baby s health. One way you can know your baby s doing well is to record the baby s movements once a day. This is called a kick count.   Remember to take your kick count records to all your appointments with your healthcare provider.  How to count kicks    Time how long it takes you to feel 10 kicks, flutters, swishes, or rolls. Ideally, you want to feel at least 10 movements in 2 hours. You will likely feel 10 movements in less time than that.  Starting at 28 weeks, count your baby's movements daily. Follow your healthcare provider's instructions for kick counting. Here are tips for counting kicks:    Choose a time when the baby is active, such as after a meal.     Sit comfortably or lie on your side.     The first time the baby moves, write down the time.     Count each movement until the baby has moved  10 times. This can take from 20 minutes to 2 hours.     If you haven't felt 10 kicks by the end of the second hour, wait a few hours. Then try again.    Try to do it at the same time each day.  When to call your healthcare provider  Call your healthcare provider  right away if:    You do a couple sets of kick counts during the day and your baby moves fewer than 10 times in 2 hours.    Your baby moves much less often than on the days before.    You haven't felt your baby move all day.  [employer] last reviewed this educational content on 8/1/2020 2000-2022 [employer]. All rights reserved. This information is not intended as a substitute for professional medical care. Always follow your healthcare professional's instructions.        Counting Your Baby's Movements   Counting your unborn baby's movements will assure you of your baby's health.   The best time to count is when your baby is most active. Do it at the same time every day.  To keep track of your baby's movements, use the attached chart. Bring the chart with you to each clinic visit.  1. Do not  smoke for at least 2 hours before counting your baby's movements. (In fact, it's best to quit smoking if you're pregnant.)  2. Lie on your side. Put your hand on your baby.  3. Write the time you start counting movements.  4. Count the next 10 kicks, rolls, and other movements.  5. Write the time when your baby has finished 10 movements.  6. If you reach 10 movements within 2 hours, you're done for the day.  Call your care provider right away if:    You feel no movement for the first hour.    It takes more than 2 hours to feel 10 movements.    There's a change in the normal pattern of movements.  Your care provider's phone number is:   ________________________________________  The number to your [facility] center is:   ________________________________________     For informational purposes only. Not to replace the advice of your health care provider. Copyright   ,  [facility]. All rights reserved. Clinically reviewed by Pura Tran RN, [facility]. [employer] [id number] - REV 10/21.       Understanding  Labor  Going into labor before week 37 of pregnancy is called  labor.  labor can cause your baby to be born too soon. This can lead to health problems for your baby.     Before labor, the cervix is thick and closed.      In  labor, the cervix begins to efface (thin) and dilate (open).     Symptoms of  labor  If you think you re having  labor, get medical help right away. Contractions alone don t mean you re in  labor. What matters more are changes in your cervix. The cervix is the opening at the lower end of the uterus. Symptoms of  labor include:    4 or more contractions per hour    Strong contractions    Constant menstrual-like cramping    Low-back pain    Mucous or bloody fluid from the vagina    Bleeding or spotting in the second or third trimester  Evaluating  labor  Your healthcare provider will try to find  out if you re in  labor or just having contractions. They may watch you for a few hours. You may have these tests:    Pelvic exam. This is to see if your cervix has effaced (thinned) and dilated (opened).    Uterine activity monitoring. This is used to detect contractions.    Fetal monitoring. This is done to check the health of your baby.    Ultrasound. This test looks at your baby s size and position.    Amniocentesis. This test checks how mature your baby s lungs are.  Caring for yourself at home  If you have  contractions, but your cervix is still thick and closed, your healthcare provider may tell you to:    Drink plenty of water.    Do fewer activities.    Rest in bed on your side.    Don't have intercourse or stimulate your nipples.  When to call your healthcare provider  Call your healthcare provider if you have any of these:    4 or more contractions per hour    Bag of water breaks    Bleeding or spotting  If you need hospital care   labor often means that you need hospital care. You may need complete bed rest. You may have an IV (intravenous) line in your arm or hand. This is to give you fluids. You may be given pills or injections. These are done to help prevent contractions. You may get a medicine called a corticosteroid. This is to help your baby s lungs mature more quickly.  Are you at risk?  Any pregnant woman can have  labor. It may start for no reason. But these risk factors can increase your chances:    Past  labor or early birth    Smoking, drug, or alcohol use in pregnancy    A multiple pregnancy (twins or more)    Problems with the shape of the uterus    Bleeding during the pregnancy  The dangers of  birth  A baby born too soon may have health problems. This is because the baby didn t have enough time to grow. Some of the risks for your baby include:    Not breastfeeding or feeding well    Having immature lungs    Bleeding in the brain    Death  Reaching  "term  Your goal is to get as close to term (week 37 or later) as you can before giving birth. The closer you get to term, the higher your chance of having a healthy baby. Work with your healthcare provider. Together, you can take steps that may keep you from giving birth too early.  Leanne last reviewed this educational content on 10/1/2021    1770-7550 [employer]. All rights reserved. This information is not intended as a substitute for professional medical care. Always follow your healthcare professional's instructions.          Using Nitrous Oxide During Labor  What is nitrous oxide?  Nitrous oxide is a mixture of 50% nitrous oxide gas and 50% oxygen that is inhaled through a mask. Nitrous oxide is better known for use in dental offices or before surgery to help patients relax. Most people know of it as \"laughing gas.\"   How do I use it during labor?  You hold your own mask and begin to inhale the gas mixture about 30 seconds before a contraction begins. Breathing before and during a contraction helps the gas work the best right at the peak of the contraction, providing the greatest relief. It may take 3 to 4 contractions to get used to the rhythm of breathing the nitrous oxide.   Does nitrous oxide have any side effects?  Some women have reported dizziness, feeling sleepy, dry mouth and nausea (feeling sick to your stomach) with use of nitrous oxide. These side effects often decrease over time. Medicine is available to help ease nausea if it is a concern for you.   Is any extra monitoring required for me or my baby?  No. Your monitoring will not need to change just because you are using nitrous oxide.   Can I still be out of bed and use nitrous oxide?  Yes. Women sometimes feel dizzy when using nitrous oxide. For this reason, you will begin using nitrous oxide while in a bed or chair. If you feel okay, you may get up and walk or use a birthing ball or stool. It will be important that you have " someone in the room close by for support.   Can I use nitrous oxide and have IV pain medicines at the same time?  No. The combination of narcotics (injectable pain medications) and nitrous oxide can slow your breathing, so it is not safe for them to be used together. You may use nitrous oxide before receiving an epidural or IV pain medication.  If I use nitrous oxide can I still get an epidural?  Yes. You may wish to use nitrous oxide until you are ready for an epidural. Nitrous oxide can be less effective than an epidural in reducing labor pain. If an epidural is part of your birth plan, it is important that you get your epidural while you are still able to sit for safe placement.   Are there reasons I couldn't use nitrous oxide?   Yes. You cannot use nitrous oxide if you:    Cannot hold your own face mask.    Have received a dose of narcotic in the past few hours (your nurse will discuss this with you).    Have a documented B12 deficiency.    Have one of a very few other rare medical conditions.  Can my labor support person help me with my nitrous oxide?  No! Only you can administer nitrous oxide to yourself. Part of the built-in safety of nitrous oxide is that you hold your own mask. If anyone in the room is found to be handling the nitrous oxide equipment other than you or your nurse, the nitrous oxide will be removed.  Can I use nitrous oxide with other procedures?   Yes. If nitrous oxide is right for you and your provider agrees, it may be used in these situations:    During the repair of a laceration or episiotomy    Manual removal of your placenta    Blood draws    IV placement  For informational purposes only. Not to replace the advice of your health care provider. Copyright   2019 [employer]. All rights reserved. Clinically reviewed by  [employer] APNL Team. [facility] 994029 - Rev .          Pregnancy: Your Third Trimester Changes  As the baby grows, your body  changes, too. You may also see signs that your body is getting ready for labor. Be patient. Within a few more weeks, your baby will be born.   How you are changing  Your body is preparing for the birth of your baby. Some of the most common changes are listed below. If you have any questions or concerns, ask your healthcare provider:     You ll gain more weight from fluids, extra blood, and fat deposits.    Your breasts will grow as your body gets ready to feed the baby. They may be more tender. You may also notice a slight yellow or white discharge from the nipples.    Discharge from your vagina may increase. This is normal.    You might see some skin color changes on your forehead, cheeks, or nose. Most of these will go away after you deliver.  How your baby is growing  Month 7  Your baby can open and close their eyes and weighs around 4 pounds (1.8 kg). If born prematurely (too early), your baby would likely survive with special care.     Month 8  Your baby is building up body fat and weighs around 6 pounds (2.7 kg).    Month 9  Your baby weighs nearly 7 pounds (3.2 kg) and is about 19 to 21 inches long. In other words, any day now...     [employer] last reviewed this educational content on 9/1/2021 2000-2022 [employer]. All rights reserved. This information is not intended as a substitute for professional medical care. Always follow your healthcare professional's instructions.          Birth Control Methods  Birth control methods are used to help prevent pregnancy. There are many different methods to choose from. Talk with your healthcare provider about which method is right for you. Be sure to ask your provider how well each one works. Also ask about the benefits, risks, and side effects of each method.   Hormones  Some birth control methods work by releasing hormones such as progestin and estrogen. These methods include hormone implants, hormone shots, the vaginal ring, the patch, and birth  control pills. They all work by stopping the release of the egg from the ovary (ovulation). All of these methods work well and can be stopped at any time.     The implant is a small device that needs to be placed in the upper arm by a trained healthcare provider. It works for up to 3 years.    Hormone injections must be repeated every 3 months.    The vaginal ring must be replaced monthly. It can be removed during the fourth week of each cycle.    The patch must be replaced weekly. It's not worn during the fourth week of each cycle.    Birth control pills must be taken every day.  Intrauterine device (IUD)  An IUD is a small, T-shaped device. It must be placed in the uterus by a trained healthcare provider. There are different types of IUDs available. They work by causing changes in the uterus that make it harder for sperm to reach the egg. Depending on the type of IUD you have, it may work for several years or longer. The IUD is a reversible birth control method. This means it can be removed at any time.   Condom  A condom is a sheath that forms a thin barrier between the penis and the vagina. It helps prevent pregnancy by keeping sperm from entering the vagina. When latex condoms are used, they have the added benefit of protecting against most STIs (sexually transmitted infections). Condoms are used each time there is sexual intercourse and should be discarded after each use. Ask your healthcare provider about the different types of condoms available. These include both the male condom and female condom.   Spermicide  Spermicides come as foams, jellies, creams, suppositories, and tablets.  They help prevent pregnancy by killing sperm. When used alone they are not that reliable. They work best when combined with other birth control methods such as diaphragms and cervical caps.   Sponge, diaphragm, and cervical cap   All of these methods help prevent pregnancy by covering the opening of the uterus (cervix). This  prevents sperm from passing through.   The sponge contains spermicide. It can be bought over the counter. The sponge must be left in place for at least 6 hours after the last time you have sex. However, it should not stay in place for more than 24 hours. Discard after use.   The diaphragm and cervical cap must be fitted and prescribed by your healthcare provider. Both are used with spermicide. The diaphragm must be left in place for at least 6 hours after sex. However, it should not stay in place for more than 24 hours. It can be washed and reused. The cervical cap must be left in place for at least 6 hours after sex. However, it should not stay in place for more than 48 hours. It can be washed and reused.   Withdrawal method  This is when the man pulls his penis out of the vagina just before ejaculation ( coming ). This lowers the amount of sperm entering the vagina. Be aware that fluids released just before ejaculation often still contain some sperm, so this method is not as reliable as certain other methods.   Rhythm method   This method is also call natural family planning or fertility awareness. It requires that you know when in your menstrual cycle you are likely to become pregnant. Then you not have sex during those days. This requires careful planning and good discipline. Your healthcare provider can explain more about how this works.   Tubal ligation and vasectomy  These are surgical methods to prevent pregnancy. Tubal ligation is an option for women. The fallopian tubes are blocked or cut (ligated). This keeps the egg from passing into the uterus or sperm from reaching the egg. Vasectomy is an option for men. The tubes that normally carry sperm to the penis are either closed or blocked. Both tubal ligation and vasectomy are permanent birth control methods. This means reversal is either not possible or unlikely to work. They are good choices for women and men who know that they don't want to have children  in the future.   [employer] last reviewed this educational content on 7/1/2020 2000-2022 [employer]. All rights reserved. This information is not intended as a substitute for professional medical care. Always follow your healthcare professional's instructions.

## 2023-06-21 NOTE — NURSING NOTE
Pt presents to clinic today for prenatal care 28w0d. Pt denies any contractions, bleeding, or leakage of fluid at this time. States baby is moving good.      Medication Reconciliation: complete  Angelic Gross LPN

## 2023-06-21 NOTE — PROGRESS NOTES
Return OB Visit    S: Ms. Jaimes is feeling well today. She has no acute concerns. Denies leaking of fluid, vaginal bleeding, painful contractions. Notes fetal movements.     O:   /70   Pulse 86   Wt 80.6 kg (177 lb 12.8 oz)   LMP 2022 (Approximate)   BMI 28.27 kg/m    Gen: Well-appearing, NAD    FH 27 cm   bpm    Assessment:  Ms. Genet Jaimes is a 32 year old yo  here today for OB follow up. She is currently 28w0d. Her pregnancy is uncomplicated.     Plan:  # Routine Prenatal Care  -- Dating: LMP=11 week US YVONNE: 2023  -- PNLs:               A+, Ab screen neg              RPR nr              Hep B S Ag neg              Hep C neg              Rubella immune              HIV neg     -- Genetic Screening: negative carrier screen, low risk XY-- Kaladin Joseph  -- Anatomy US: 55%ile normal anatomy  -- Immunizations: Tdap   -- 3rd TM labs including CBC, RPR: today  -- 1 hr GTT: today  -- GBS: Planned for 36 weeks  -- Postpartum Planning: To be discussed   -- Delivery Planning: No indication for early IOL at this time. To be discussed continually. Plans for epidural- had trouble with first epidural  -- Return to clinic in 4 weeks for OB follow up visit  -- Planning to do at next visit: follow up labs, anesthesia consult     # Plan for anesthesia consult 32-34 weeks  -- epdiural did not wear off for 3 days after first baby    Socorro Bello MD  OB/GYN  2023 9:20 AM

## 2023-06-22 LAB
IGA SERPL-MCNC: 137 MG/DL (ref 84–499)
T PALLIDUM AB SER QL: NONREACTIVE
WHEAT IGE QN: <0.1 KU(A)/L

## 2023-06-23 LAB
ENDOMYSIUM IGA TITR SER IF: NORMAL {TITER}
GLIADIN IGA SER-ACNC: 0.9 U/ML
GLIADIN IGG SER-ACNC: 2.9 U/ML
TTG IGA SER-ACNC: <0.2 U/ML
TTG IGG SER-ACNC: <0.6 U/ML

## 2023-06-24 LAB
DEPRECATED MISC ALLERGEN IGE RAST QL: NORMAL
RED KIDNEY BEAN IGE QN: <0.1 KU/L

## 2023-07-10 DIAGNOSIS — O21.9 NAUSEA AND VOMITING IN PREGNANCY: ICD-10-CM

## 2023-07-10 DIAGNOSIS — Z34.92 SECOND TRIMESTER PREGNANCY: ICD-10-CM

## 2023-07-10 RX ORDER — METOCLOPRAMIDE 10 MG/1
TABLET ORAL
Qty: 20 TABLET | Refills: 0 | Status: ON HOLD | OUTPATIENT
Start: 2023-07-10 | End: 2023-09-20

## 2023-07-10 NOTE — TELEPHONE ENCOUNTER
[id number]  sent Rx request for the following:      Requested Prescriptions   Pending Prescriptions Disp Refills     metoclopramide (REGLAN) 10 MG tablet [Pharmacy Med Name: Metoclopramide HCl 10 MG Oral Tablet] 20 tablet 0     Sig: TAKE 1 TABLET BY MOUTH TWICE DAILY AS NEEDED FOR NAUSEA     Last Prescription Date:   06/21/23  Last Fill Qty/Refills:         20, R-0  Last Office Visit:              06/21/23   Future Office visit:             Next 5 appointments (look out 90 days)    Jul 18, 2023  8:45 AM  ESTABLISHED PRENATAL with Faith Bello MD  [facility] ([facility] ) [address]  [place] [address]  [phone]   Aug 02, 2023  9:30 AM  ESTABLISHED PRENATAL with Faith Bello MD  [facility] ([facility] ) [address]  [place] [address]  [phone]   Aug 16, 2023  9:30 AM  ESTABLISHED PRENATAL with Faith Bello MD  [facility] [address]  [place] [address]  [phone]   Aug 23, 2023  9:30 AM  ESTABLISHED PRENATAL with Faith Bello MD  [facility] [address]  [place] [address]  [phone]   Aug 31, 2023 11:30 AM  ESTABLISHED PRENATAL with Socrates Montenegro MD  [facility] [address]  [place] [address]  [phone]        Michaelle Huang RN on 7/10/2023 at 11:46 AM

## 2023-07-12 NOTE — PATIENT INSTRUCTIONS
The Benefits of Breastmilk  Breastmilk is the best food for your baby. It has just the right amount of nutrients. It protects your baby's digestive system. It protects other body systems in your baby, and it helps your baby grow and develop.       Healthiest for baby  Breastmilk is the ideal food for babies. It has all the nutrients your baby needs to grow healthy and strong.    Breastmilk has these benefits:    It lowers the risk for sudden infant death syndrome (SIDS).    It gives babies a lower risk for ear infections in their first year. This is compared to babies who are fed formula.    It has DHA. This is a type of fat. It helps your baby s growing brain, nervous system, and eyes.    It is full of antibodies. These help your baby fight infection.    It lowers your baby's risk for lung illness.    It lowers the risk of diarrhea.    It lowers your baby s risk for allergies. Babies fed formula are more likely to have an allergy to cow's milk.    It lowers your baby s risk for colds and many other diseases.    It changes as your baby grows. This meets your baby's changing needs.  And it s important to know that:    Giving only breastmilk for the first 6 months gives your baby more of these benefits.    Giving breastmilk plus solid food from 6 months to 1 year or more gives more benefits.     babies have fewer long-term health problems when they grow up. These problems include diabetes and obesity.    Breastfeeding gives contact that your baby loves. Spending time skin-to-skin with you is calming and comforting.  Healthiest for mom  For many people, breastfeeding is a good experience. It creates a strong bond between mother and baby. People who breastfeed also get health benefits. Some benefits for you include:     You can know that your baby is growing healthy and strong because of your milk.    Breastmilk is convenient. It's free and clean. It's always at the right temperature.    Breastfeeding burns  calories. This can help you lose pregnancy weight faster.    Breastfeeding releases hormones that contract the uterus. This helps the uterus return to its normal size after childbirth.    Mothers who breastfeed have a lower risk for ovarian and breast cancers.    Some studies have found that breastfeeding may reduce a person's risk for type 2 diabetes and rheumatoid arthritis. It may reduce the risk of cardiovascular disease. This includes high blood pressure and high cholesterol.    Breastfeeding every day delays the return of your menstrual period. This can help extend the time between pregnancies.  Many people can help you learn to breastfeed. A lactation consultant can help. This is a healthcare provider who is trained to help you breastfeed. Your nurse, midwife, nurse practitioner, obstetrician, pediatrician, or family practice healthcare provider can also help you learn about breastfeeding.   What does it mean to give only breastmilk?   Giving only breastmilk for at least the first 6 months of life is best for your baby. If you need to be away from your baby, you can express breastmilk. This means pumping milk from your breast into a container. Talk with your healthcare provider about the best ways to feed this milk to your baby.    You should not give your baby water, sugar water, formula, or solids during their first 6 months unless your baby's healthcare provider tells you to.   Your baby s provider may tell you to give your baby vitamins, minerals, or medicines.  babies should be given vitamin D supplements. The provider will tell you the type and amount of vitamin D to give your baby.   What are the risks of not giving only breastmilk?   You now know the many of the benefits of breastfeeding. But you might not know why it's important to give only breastmilk for at least 6 months.   Your baby gets the best protection against health problems when they get only breastmilk. Breastfeeding some of the  time is good. But breastfeeding all of the time is best.   Giving your baby formula or other liquids may cause you to:    Have more problems breastfeeding    Make less milk    Be less confident in breastfeeding    Breastfeed less often    Stop breastfeeding before your baby is at least 12 months old                                                     When other choices may be needed  Giving only breastmilk is almost always the best thing to do. But your healthcare provider may have reasons to advise giving your baby formula or other liquids. They include:     Your baby has a health problem. There are cases where you may need to add formula or other liquids. This is often only for a short time. This may be the case if your baby has low blood sugar (hypoglycemia), loses body fluids (dehydration), or has high levels of bilirubin.    You have certain health problems. Some infections can be passed from your skin to your baby's skin. Or it can pass through your breastmilk. People with HIV/AIDS or untreated and contagious TB (tuberculosis) should not breastfeed. Women with active skin sores from chickenpox (varicella) can pump their breastmilk and feed their baby. But they should keep their baby s skin from touching any of the sores.    You use illegal drugs or drink alcohol. People who use illegal drugs should not breastfeed. If you are going to have a drink that has alcohol, it's best to do so just after you nurse or pump milk. Breastfeeding or pumping breast milk is OK at least 4 hours after your last drink. That way, your body will have some time to get rid of the alcohol before the next feeding and less of it will reach your baby. Long-term exposure to alcohol in breastmilk may affect your baby's health. It may also cause you to make less milk.    You take certain medicines. If you take any medicines, ask your baby s healthcare provider if you can breastfeed.  Leanne last reviewed this educational content on  2022 [employer]. All rights reserved. This information is not intended as a substitute for professional medical care. Always follow your healthcare professional's instructions.          What Is Group B Strep?  Group B strep (streptococcus) is a common type of bacteria. It can grow in the vagina, rectum, or urinary tract. It most often does not cause harm in adults. But in rare cases, a pregnant person who has group B strep can infect the baby during birth. This can cause serious illness in the . But treatment during labor reduces the risk of the baby becoming infected. And if a  gets group B strep, the infection can be treated.     Facts about group B strep   Learning more about group B strep can help you understand how testing and treatment can help. Here are some basic facts about group B strep:     It's not a sexually transmitted infection.    It's not the same as strep throat. (That is caused by group A strep.)    It often has no symptoms. It may cause no problems in adults.    Test results can be misleading. They may be negative one week and positive the next week.    Group B strep can be spread to the baby during vaginal delivery. It can't be passed during  (surgical) birth.    A person with group B strep rarely infects the . (Infection happens only about 1% to 2% of the time.)    When a person is treated during labor and delivery, the baby almost never becomes infected.    Certain factors during pregnancy increase the risk of a baby becoming infected.  Possible effects on your baby   Group B strep can infect the blood. It can also cause inflammation of the baby s lungs, brain, or spinal cord. Long-term effects can include blindness, deafness, mental retardation, or cerebral palsy. And in rare cases, infection causes death. Infection is most often found soon after the baby is born.   How your baby may become infected   Group B strep often lives in  the vagina or rectum. If the amniotic sac breaks early, bacteria from the vagina can travel to the uterus, reaching the baby. Or, while passing through the birth canal, the baby can come in contact with the bacteria. In rare cases, group B strep can be passed to the baby after delivery. This is called late-onset group B strep. The source of this type of infection is not well-understood. But some experts believe that it happens if the baby is exposed to group B strep in the home, from the parents or siblings, or in the community.   What increases the risk?   Certain things increase the chance that a baby will be infected. They include:    Breaking or leaking of the amniotic sac before 37 weeks of pregnancy    Labor before 37 weeks of pregnancy    Breaking of the amniotic sac more than 18 hours before labor starts    Fever during labor    A urinary tract infection with group B strep at any point in the pregnancy    Having a previous baby born with a group B strep infection  Leanne last reviewed this educational content on 6/1/2022 2000-2023 [employer]. All rights reserved. This information is not intended as a substitute for professional medical care. Always follow your healthcare professional's instructions.

## 2023-07-17 ENCOUNTER — PRENATAL OFFICE VISIT (OUTPATIENT)
Dept: OBGYN | Facility: OTHER | Age: 33
End: 2023-07-17
Payer: COMMERCIAL

## 2023-07-17 VITALS
BODY MASS INDEX: 29.01 KG/M2 | HEART RATE: 82 BPM | DIASTOLIC BLOOD PRESSURE: 68 MMHG | SYSTOLIC BLOOD PRESSURE: 116 MMHG | WEIGHT: 182.5 LBS

## 2023-07-17 DIAGNOSIS — Z34.93 ENCOUNTER FOR PREGNANCY RELATED EXAMINATION IN THIRD TRIMESTER: Primary | ICD-10-CM

## 2023-07-17 PROCEDURE — 99207 PR OB VISIT-NO CHARGE - GICH ONLY: CPT

## 2023-07-17 ASSESSMENT — PAIN SCALES - GENERAL: PAINLEVEL: MODERATE PAIN (4)

## 2023-07-17 NOTE — NURSING NOTE
Patient presents to clinic for ob visit,patietn is 31 weeks 5 days  Has some concerns of blurred vision and dizziness as well as some pressure on abdomen  /68   Pulse 82   Wt 82.8 kg (182 lb 8 oz)   LMP 12/07/2022 (Approximate)   BMI 29.01 kg/m    Solange Gross LPN on 7/17/2023 at 1:01 PM

## 2023-07-17 NOTE — PROGRESS NOTES
OB Visit    S: Patient is feeling well today. Denies LOF, VB, or regular Ctx. +FM.    Concerns: pain around her umbilical area intermittently, had dizziness which resolved with eating    O: /68   Pulse 82   Wt 82.8 kg (182 lb 8 oz)   LMP 2022 (Approximate)   BMI 29.01 kg/m    Gen: Well-appearing, NAD  FH: 31  FHT: 122  Abdomen: Umbilical hernia noted, easily reducible, 2cm in size      A/P:  Genet Jaimes is a 32 year old  at 31w5d by , here for return OB visit.  Return precautions given for umbilical hernia.   PNC: - Prenatal labs WNL, Rh +, Rubella immune, , 3rd Trimester HGB 10.69, GBS TBD   Rhogam: NA  Genetics: low risk   Imaging: WNL anatomy  Immunizations: TDAP 23    RTC 2 weeks w      Phuong SHARMA, FNP-C  2023 1:26 PM

## 2023-07-31 NOTE — PATIENT INSTRUCTIONS
"Week 37 of Your Pregnancy: Care Instructions    Most babies are born between 37 and 40 weeks.   This is a good time to pack a bag to take with you to the birth. Then it will be ready to go when you are.     Learn about breastfeeding.  For example, find out about ways to hold your baby to make breastfeeding easier. And think about learning how to pump and store milk.     Know that crying is normal.  It's common for babies to cry 1 to 3 hours a day. Some cry more, and some cry less.     Learn why babies cry.  They may be hungry; have gas; need a diaper change; or feel cold, warm, tired, lonely, or tense. Sometimes they cry for unknown reasons.     Think about what will help you stay calm when your baby cries.  Taking slow, deep breaths can help. So can taking a break. It's okay to put your baby somewhere safe (like their crib) and walk away for a few minutes.     Learn about safe sleep for your baby.  Always put your baby to sleep on their back. Place them alone in a crib or bassinet with a firm, flat surface. Keep soft items like stuffed animals out of the crib.     Learn what to expect with  poop.  Your baby will have their own bowel patterns. Some babies have several bowel movements a day. Some have fewer.     Know that  babies will often have loose, yellow bowel movements.  Formula-fed babies have more formed stools. If your baby's poop looks like pellets, your baby is constipated.   Follow-up care is a key part of your treatment and safety. Be sure to make and go to all appointments, and call your doctor if you are having problems. It's also a good idea to know your test results and keep a list of the medicines you take.  Where can you learn more?  Go to [email or web address].net/patiented  Enter N257 in the search box to learn more about \"Week 37 of Your Pregnancy: Care Instructions.\"  Current as of: 2022               Content Version: 13.7    8462-0676 [employer]. "   Care instructions adapted under license by your healthcare professional. If you have questions about a medical condition or this instruction, always ask your healthcare professional. [employer] disclaims any warranty or liability for your use of this information.

## 2023-08-02 ENCOUNTER — HOSPITAL ENCOUNTER (OUTPATIENT)
Dept: ULTRASOUND IMAGING | Facility: OTHER | Age: 33
Discharge: HOME | End: 2023-08-02
Attending: STUDENT IN AN ORGANIZED HEALTH CARE EDUCATION/TRAINING PROGRAM
Payer: COMMERCIAL

## 2023-08-02 ENCOUNTER — PRENATAL OFFICE VISIT (OUTPATIENT)
Dept: OBGYN | Facility: OTHER | Age: 33
End: 2023-08-02
Attending: STUDENT IN AN ORGANIZED HEALTH CARE EDUCATION/TRAINING PROGRAM
Payer: COMMERCIAL

## 2023-08-02 VITALS
DIASTOLIC BLOOD PRESSURE: 70 MMHG | SYSTOLIC BLOOD PRESSURE: 118 MMHG | WEIGHT: 182.1 LBS | BODY MASS INDEX: 28.95 KG/M2 | HEART RATE: 72 BPM

## 2023-08-02 DIAGNOSIS — O26.843 UTERINE SIZE-DATE DISCREPANCY IN THIRD TRIMESTER: ICD-10-CM

## 2023-08-02 DIAGNOSIS — Z34.93 THIRD TRIMESTER PREGNANCY: Primary | ICD-10-CM

## 2023-08-02 PROCEDURE — 99207 PR OB VISIT-NO CHARGE - GICH ONLY: CPT | Performed by: STUDENT IN AN ORGANIZED HEALTH CARE EDUCATION/TRAINING PROGRAM

## 2023-08-02 PROCEDURE — 76816 OB US FOLLOW-UP PER FETUS: CPT

## 2023-08-02 RX ORDER — SIMETHICONE 125 MG
125 TABLET,CHEWABLE ORAL 2 TIMES DAILY
Qty: 30 TABLET | Refills: 1 | Status: SHIPPED | OUTPATIENT
Start: 2023-08-02

## 2023-08-02 NOTE — PROGRESS NOTES
Return OB Visit    S: Ms. Jaimes is feeling well today. She has no acute concerns. Denies leaking of fluid, vaginal bleeding, painful contractions. Notes fetal movements.    O: /70   Pulse 72   Wt 82.6 kg (182 lb 1.6 oz)   LMP 2022 (Approximate)   BMI 28.95 kg/m    Gen: Well-appearing, NAD    FH 30 cm   Getting US for size    bpm    Assessment:  Ms. Genet Jaimes is a 32 year old yo  here today for OB follow up. She is currently 34w0d. Her pregnancy is uncomplicated.     Plan:  # Routine Prenatal Care  -- Dating: LMP=11 week US YVONNE: 2023  -- PNLs:               A+, Ab screen neg              RPR nr              Hep B S Ag neg              Hep C neg              Rubella immune              HIV neg     -- Genetic Screening: negative carrier screen, low risk XY-- Kaladin Joseph  -- Anatomy US: 55%ile normal anatomy  -- Immunizations: Tdap   -- 3rd TM labs including CBC, RPR: RPR nr, Hgb 10.96, Plt 153  -- 1 hr GTT: 109  -- GBS: next visit  -- Postpartum Planning: breastfeeding, declines contraception  -- Delivery Planning: No indication for early IOL at this time. To be discussed continually. Plans for epidural- had trouble with first epidural  -- Return to clinic in 2 weeks for OB follow up visit  -- Planning to do at next visit: follow up anesthesia consult     # Plan for anesthesia consult 32-34 weeks  -- epdiural did not wear off for 3 days after first baby    Socorro Bello MD  OB/GYN  2023 9:24 AM

## 2023-08-02 NOTE — NURSING NOTE
Pt presents to clinic today fore prenatal care 34w0d. Pt denies any contractions, bleeding, or leakage of fluid at this time. States baby is moving good.    Medication Reconciliation: complete  Angelic Gross LPN

## 2023-08-16 ENCOUNTER — PRENATAL OFFICE VISIT (OUTPATIENT)
Dept: OBGYN | Facility: OTHER | Age: 33
End: 2023-08-16
Attending: STUDENT IN AN ORGANIZED HEALTH CARE EDUCATION/TRAINING PROGRAM
Payer: COMMERCIAL

## 2023-08-16 VITALS
SYSTOLIC BLOOD PRESSURE: 112 MMHG | HEART RATE: 83 BPM | DIASTOLIC BLOOD PRESSURE: 62 MMHG | WEIGHT: 184 LBS | BODY MASS INDEX: 29.25 KG/M2

## 2023-08-16 DIAGNOSIS — Z34.93 THIRD TRIMESTER PREGNANCY: Primary | ICD-10-CM

## 2023-08-16 PROCEDURE — 99207 PR OB VISIT-NO CHARGE - GICH ONLY: CPT | Performed by: STUDENT IN AN ORGANIZED HEALTH CARE EDUCATION/TRAINING PROGRAM

## 2023-08-16 PROCEDURE — 87077 CULTURE AEROBIC IDENTIFY: CPT | Mod: ZL | Performed by: STUDENT IN AN ORGANIZED HEALTH CARE EDUCATION/TRAINING PROGRAM

## 2023-08-16 PROCEDURE — 87653 STREP B DNA AMP PROBE: CPT | Mod: ZL | Performed by: STUDENT IN AN ORGANIZED HEALTH CARE EDUCATION/TRAINING PROGRAM

## 2023-08-16 ASSESSMENT — PAIN SCALES - GENERAL: PAINLEVEL: NO PAIN (0)

## 2023-08-16 NOTE — NURSING NOTE
"Chief Complaint   Patient presents with    Prenatal Care     36w   Pt presents to clinic today for prenatal care 36 w. Pt denies any bleeding, or leakage of fluid at this time. States baby is moving good. Patient c/o [place] serrano contractions.      Initial /62   Pulse 83   Wt 83.5 kg (184 lb)   LMP 12/07/2022 (Approximate)   BMI 29.25 kg/m   Estimated body mass index is 29.25 kg/m  as calculated from the following:    Height as of 2/23/23: 1.689 m (5' 6.5\").    Weight as of this encounter: 83.5 kg (184 lb).  Medication Reconciliation: complete        Faith Traylor LPN    "

## 2023-08-16 NOTE — PROGRESS NOTES
Return OB Visit    S: Ms. Jaimes is feeling well today. She has no acute concerns. Denies leaking of fluid, vaginal bleeding, painful contractions. Notes fetal movements.    O: /62   Pulse 83   Wt 83.5 kg (184 lb)   LMP 2022 (Approximate)   BMI 29.25 kg/m    Gen: Well-appearing, NAD    FH 35 cm   bpm    Assessment:  Ms. Genet Jaimes is a 32 year old yo  here today for OB follow up. She is currently 36w0d. Her pregnancy is uncomplicated.     Plan:  # Routine Prenatal Care  -- Dating: LMP=11 week US YVONNE: 2023  -- PNLs:               A+, Ab screen neg              RPR nr              Hep B S Ag neg              Hep C neg              Rubella immune              HIV neg     -- Genetic Screening: negative carrier screen, low risk XY-- Kaladin Joseph  -- Anatomy US: 55%ile normal anatomy  -- Immunizations: Tdap   -- 3rd TM labs including CBC, RPR: RPR nr, Hgb 10.96, Plt 153  -- 1 hr GTT: 109  -- GBS: today  -- Postpartum Planning: breastfeeding, declines contraception  -- Delivery Planning: No indication for early IOL at this time. To be discussed continually. Plans for epidural- had trouble with first epidural. Desires possible eIOL  -- Return to clinic in 1 weeks for OB follow up visit  -- Planning to do at next visit: GBS results, eIOL planning?, JONEL, bedside US    # Plan for anesthesia consult 32-34 weeks  -- epdiural did not wear off for 3 days after first baby    Socorro Bello MD  OB/GYN  2023 10:38 AM

## 2023-08-17 LAB — GP B STREP DNA SPEC QL NAA+PROBE: POSITIVE

## 2023-08-20 LAB — BACTERIA SPEC CULT: ABNORMAL

## 2023-08-23 ENCOUNTER — PRENATAL OFFICE VISIT (OUTPATIENT)
Dept: OBGYN | Facility: OTHER | Age: 33
End: 2023-08-23
Attending: STUDENT IN AN ORGANIZED HEALTH CARE EDUCATION/TRAINING PROGRAM
Payer: COMMERCIAL

## 2023-08-23 VITALS
WEIGHT: 187.3 LBS | HEART RATE: 86 BPM | SYSTOLIC BLOOD PRESSURE: 116 MMHG | BODY MASS INDEX: 29.78 KG/M2 | DIASTOLIC BLOOD PRESSURE: 74 MMHG

## 2023-08-23 DIAGNOSIS — Z34.93 THIRD TRIMESTER PREGNANCY: Primary | ICD-10-CM

## 2023-08-23 PROCEDURE — 99207 PR OB VISIT-NO CHARGE - GICH ONLY: CPT | Performed by: STUDENT IN AN ORGANIZED HEALTH CARE EDUCATION/TRAINING PROGRAM

## 2023-08-23 NOTE — PROGRESS NOTES
Return OB Visit    S: Ms. Jaimes is feeling well today. She has no acute concerns. Denies leaking of fluid, vaginal bleeding, painful contractions. Notes fetal movements.    O: /74   Pulse 86   Wt 85 kg (187 lb 4.8 oz)   LMP 2022 (Approximate)   BMI 29.78 kg/m    Gen: Well-appearing, NAD    FH 35 cm   bpm  Bedside US conforms cephalic    Assessment:  Ms. Genet Jaimes is a 32 year old yo  here today for OB follow up. She is currently 37w0d. Her pregnancy is uncomplicated.     Plan:  # Routine Prenatal Care  -- Dating: LMP=11 week US YVONNE: 2023  -- PNLs:               A+, Ab screen neg              RPR nr              Hep B S Ag neg              Hep C neg              Rubella immune              HIV neg     -- Genetic Screening: negative carrier screen, low risk XY-- Kaladin Joseph  -- Anatomy US: 55%ile normal anatomy  -- Immunizations: Tdap   -- 3rd TM labs including CBC, RPR: RPR nr, Hgb 10.96, Plt 153  -- 1 hr GTT: 109  -- GBS: positive  -- Postpartum Planning: breastfeeding, declines contraception  -- Delivery Planning: No indication for early IOL at this time. To be discussed continually. Plans for epidural- had trouble with first epidural. eIOL planned for   -- Return to clinic in 1 weeks for OB follow up visit  -- Planning to do at next visit: JONEL, confirm IOL     # Plan for anesthesia consult 32-34 weeks  -- epdiural did not wear off for 3 days after first baby    Socorro Bello MD  OB/GYN  2023 9:38 AM

## 2023-08-23 NOTE — NURSING NOTE
Pt presents to clinic today for prenatal care 37w0d. Pt denies any contractions, bleeding, or leakage of fluid at this time. States baby Is moving good.      Medication Reconciliation: complete  Angelic Gross LPN

## 2023-08-31 ENCOUNTER — PRENATAL OFFICE VISIT (OUTPATIENT)
Dept: OBGYN | Facility: OTHER | Age: 33
End: 2023-08-31
Attending: OBSTETRICS & GYNECOLOGY
Payer: COMMERCIAL

## 2023-08-31 VITALS
HEART RATE: 65 BPM | BODY MASS INDEX: 29.89 KG/M2 | DIASTOLIC BLOOD PRESSURE: 78 MMHG | WEIGHT: 188 LBS | SYSTOLIC BLOOD PRESSURE: 130 MMHG

## 2023-08-31 DIAGNOSIS — Z34.90 NORMAL PREGNANCY, ANTEPARTUM: Primary | ICD-10-CM

## 2023-08-31 PROCEDURE — 99207 PR OB VISIT-NO CHARGE - GICH ONLY: CPT | Performed by: OBSTETRICS & GYNECOLOGY

## 2023-08-31 NOTE — PROGRESS NOTES
Problem List:     Wishes to cancel induction    CC: Recheck OB visit at 38w1d    HPI: Genet Jaimes presents for a routine OB visit now at 38w1d  Concerns:   Patient notices normal fetal movement, denies contractions, vaginal bleeding or leaking of fluid.    OB History    Para Term  AB Living   3 2 2 0 0 2   SAB IAB Ectopic Multiple Live Births   0 0 0 0 2      # Outcome Date GA Lbr Dion/2nd Weight Sex Delivery Anes PTL Lv   3 Current            2 Term 10/17/19   2.948 kg (6 lb 8 oz) M Vag-Spont   BRANT      Name: Chano Tenorio Term 10/24/11    M Vag-Spont   BRNAT      Name: Silvio     Current Outpatient Medications   Medication    Cyanocobalamin (B-12) 1000 MCG TBCR    famotidine (PEPCID) 20 MG tablet    Prenatal Vit-Fe Fumarate-FA (PRENATAL MULTIVITAMIN W/IRON) 27-0.8 MG tablet    simethicone (MYLICON) 125 MG chewable tablet    docusate sodium (COLACE) 50 MG capsule    metoclopramide (REGLAN) 10 MG tablet    metoclopramide (REGLAN) 10 MG tablet     No current facility-administered medications for this visit.     O: /78   Pulse 65   Wt 85.3 kg (188 lb)   LMP 2022 (Approximate)   BMI 29.89 kg/m    Body mass index is 29.89 kg/m .  See OB flow sheet  EXAM:  NAD  S=D  FHT:140 bpm  Pleasantly declines cervix exam    No results found for any visits on 23.    A/P: 38w1d gestation    Recheck in 1 week(s)  Wishes to defer induction to allow for natural labor at this time.    Socrates Montenegro MD FACOG  11:30 AM 2023

## 2023-08-31 NOTE — NURSING NOTE
Chief Complaint   Patient presents with    Prenatal Care     Patient presents to the clinic for prenatal care 38 w 1d patient stated that she has had leaking of fluid 2 days ago. Patient denies any bleeding. She also stated that she has been feeling [place] serrano contractions. Patient stated that she can feel baby moving.      Ashley Angel LPN

## 2023-09-05 ENCOUNTER — PRENATAL OFFICE VISIT (OUTPATIENT)
Dept: OBGYN | Facility: OTHER | Age: 33
End: 2023-09-05
Attending: STUDENT IN AN ORGANIZED HEALTH CARE EDUCATION/TRAINING PROGRAM
Payer: COMMERCIAL

## 2023-09-05 VITALS
BODY MASS INDEX: 30.06 KG/M2 | SYSTOLIC BLOOD PRESSURE: 124 MMHG | HEART RATE: 80 BPM | DIASTOLIC BLOOD PRESSURE: 74 MMHG | WEIGHT: 189.1 LBS

## 2023-09-05 DIAGNOSIS — Z34.93 THIRD TRIMESTER PREGNANCY: Primary | ICD-10-CM

## 2023-09-05 PROCEDURE — 99207 PR OB VISIT-NO CHARGE - GICH ONLY: CPT | Performed by: STUDENT IN AN ORGANIZED HEALTH CARE EDUCATION/TRAINING PROGRAM

## 2023-09-05 NOTE — PROGRESS NOTES
Return OB Visit    S: Ms. Jaimes is feeling well today. She has no acute concerns. Denies leaking of fluid, vaginal bleeding, painful contractions. Notes fetal movements.    O: /74   Pulse 80   Wt 85.8 kg (189 lb 1.6 oz)   LMP 2022 (Approximate)   BMI 30.06 kg/m    Gen: Well-appearing, NAD    FH 37 cm   bpm  SVE 1-2/50/-2, soft, anterior      Assessment:  Ms. Genet Jaimes is a 32 year old yo  here today for OB follow up. She is currently 38w6d. Her pregnancy is uncomplicated.     Plan:  # Routine Prenatal Care  -- Dating: LMP=11 week US YVONNE: 2023  -- PNLs:               A+, Ab screen neg              RPR nr              Hep B S Ag neg              Hep C neg              Rubella immune              HIV neg     -- Genetic Screening: negative carrier screen, low risk XY-- Kaladin Joseph  -- Anatomy US: 55%ile normal anatomy  -- Immunizations: Tdap   -- 3rd TM labs including CBC, RPR: RPR nr, Hgb 10.96, Plt 153  -- 1 hr GTT: 109  -- GBS: positive  -- Postpartum Planning: breastfeeding, declines contraception  -- Delivery Planning: No indication for early IOL at this time. To be discussed continually. Plans for epidural- had trouble with first epidural. (IOL-LT paperwork sent for 2023)  -- Return to clinic in 1 weeks for OB follow up visit  -- Planning to do at next visit: SVE, confirm IOL     # Plan for anesthesia consult 32-34 weeks  -- epdiural did not wear off for 3 days after first baby       Socorro Bello MD  OB/GYN  2023 10:41 AM

## 2023-09-05 NOTE — NURSING NOTE
Pt presents to clinic today for prenatal care 38w6d. Pt denies any bleeding, or leakage of fluid at this time. States baby is moving good and has noticed isabel serrano.    Medication Reconciliation: complete  Angelic Gross LPN

## 2023-09-13 ENCOUNTER — PRENATAL OFFICE VISIT (OUTPATIENT)
Dept: OBGYN | Facility: OTHER | Age: 33
End: 2023-09-13
Payer: COMMERCIAL

## 2023-09-13 VITALS
WEIGHT: 188 LBS | SYSTOLIC BLOOD PRESSURE: 130 MMHG | DIASTOLIC BLOOD PRESSURE: 84 MMHG | HEART RATE: 97 BPM | BODY MASS INDEX: 29.89 KG/M2

## 2023-09-13 DIAGNOSIS — Z34.93 THIRD TRIMESTER PREGNANCY: Primary | ICD-10-CM

## 2023-09-13 PROCEDURE — 99207 PR OB VISIT-NO CHARGE - GICH ONLY: CPT

## 2023-09-13 NOTE — PROGRESS NOTES
OB Visit    S: Patient is feeling well. Denies LOF, VB, or regular Ctx. +FM. IOL planned for    Concerns: none    O: /84   Pulse 97   Wt 85.3 kg (188 lb)   LMP 2022 (Approximate)   BMI 29.89 kg/m    Gen: Well-appearing, NAD  FH: 40  FHT: 128  Bedside Us shows Head down     A/P:  Genet Jaimes is a 33 year old  at 40w0d by 11week Us, here for return OB visit.  Set for induction on 23. Declines wanting Cx today    PNC: - Prenatal labs WNL, Rh +, Rubella immune, , 3rd Trimester HGB 10.9, GBS +   Rhogam: NA  Genetics: low risk- XY  Imaging: WNL anatomy  Immunizations: TDAP     RTC 23      Phuong SHARMA, FNP-C  2023 2:53 PM

## 2023-09-13 NOTE — NURSING NOTE
Chief Complaint   Patient presents with    Prenatal Care     40w0d     Pt presents to clinic today for prenatal care 40w0d. Pt denies any bleeding, or leakage of fluid at this time. States baby is moving good. Patient denies contractions.       Ashley Angel LPN

## 2023-09-18 RX ORDER — PENICILLIN G POTASSIUM 5000000 [IU]/1
5 INJECTION, POWDER, FOR SOLUTION INTRAMUSCULAR; INTRAVENOUS ONCE
Status: CANCELLED | OUTPATIENT
Start: 2023-09-18 | End: 2023-09-18

## 2023-09-18 RX ORDER — PENICILLIN G 3000000 [IU]/50ML
3 INJECTION, SOLUTION INTRAVENOUS EVERY 4 HOURS
Status: CANCELLED | OUTPATIENT
Start: 2023-09-18

## 2023-09-18 RX ORDER — NALOXONE HYDROCHLORIDE 0.4 MG/ML
0.4 INJECTION, SOLUTION INTRAMUSCULAR; INTRAVENOUS; SUBCUTANEOUS
Status: CANCELLED | OUTPATIENT
Start: 2023-09-18

## 2023-09-18 RX ORDER — OXYTOCIN/0.9 % SODIUM CHLORIDE 30/500 ML
340 PLASTIC BAG, INJECTION (ML) INTRAVENOUS CONTINUOUS PRN
Status: CANCELLED | OUTPATIENT
Start: 2023-09-18

## 2023-09-18 RX ORDER — MISOPROSTOL 100 UG/1
400 TABLET ORAL
Status: CANCELLED | OUTPATIENT
Start: 2023-09-18

## 2023-09-18 RX ORDER — OXYTOCIN/0.9 % SODIUM CHLORIDE 30/500 ML
100-340 PLASTIC BAG, INJECTION (ML) INTRAVENOUS CONTINUOUS PRN
Status: CANCELLED | OUTPATIENT
Start: 2023-09-18

## 2023-09-18 RX ORDER — OXYTOCIN/0.9 % SODIUM CHLORIDE 30/500 ML
1-24 PLASTIC BAG, INJECTION (ML) INTRAVENOUS CONTINUOUS
Status: CANCELLED | OUTPATIENT
Start: 2023-09-18

## 2023-09-18 RX ORDER — METHYLERGONOVINE MALEATE 0.2 MG/ML
200 INJECTION INTRAVENOUS
Status: CANCELLED | OUTPATIENT
Start: 2023-09-18

## 2023-09-18 RX ORDER — CITRIC ACID/SODIUM CITRATE 334-500MG
30 SOLUTION, ORAL ORAL
Status: CANCELLED | OUTPATIENT
Start: 2023-09-18

## 2023-09-18 RX ORDER — ONDANSETRON 4 MG/1
4 TABLET, ORALLY DISINTEGRATING ORAL EVERY 6 HOURS PRN
Status: CANCELLED | OUTPATIENT
Start: 2023-09-18

## 2023-09-18 RX ORDER — OXYTOCIN 10 [USP'U]/ML
10 INJECTION, SOLUTION INTRAMUSCULAR; INTRAVENOUS
Status: CANCELLED | OUTPATIENT
Start: 2023-09-18

## 2023-09-18 RX ORDER — FENTANYL CITRATE 50 UG/ML
100 INJECTION, SOLUTION INTRAMUSCULAR; INTRAVENOUS
Status: CANCELLED | OUTPATIENT
Start: 2023-09-18

## 2023-09-18 RX ORDER — ONDANSETRON 2 MG/ML
4 INJECTION INTRAMUSCULAR; INTRAVENOUS EVERY 6 HOURS PRN
Status: CANCELLED | OUTPATIENT
Start: 2023-09-18

## 2023-09-18 RX ORDER — ACETAMINOPHEN 325 MG/1
650 TABLET ORAL EVERY 4 HOURS PRN
Status: CANCELLED | OUTPATIENT
Start: 2023-09-18

## 2023-09-18 RX ORDER — SODIUM CHLORIDE, SODIUM LACTATE, POTASSIUM CHLORIDE, CALCIUM CHLORIDE 600; 310; 30; 20 MG/100ML; MG/100ML; MG/100ML; MG/100ML
INJECTION, SOLUTION INTRAVENOUS CONTINUOUS PRN
Status: CANCELLED | OUTPATIENT
Start: 2023-09-18

## 2023-09-18 RX ORDER — METOCLOPRAMIDE HYDROCHLORIDE 5 MG/ML
10 INJECTION INTRAMUSCULAR; INTRAVENOUS EVERY 6 HOURS PRN
Status: CANCELLED | OUTPATIENT
Start: 2023-09-18

## 2023-09-18 RX ORDER — TRANEXAMIC ACID 10 MG/ML
1 INJECTION, SOLUTION INTRAVENOUS EVERY 30 MIN PRN
Status: CANCELLED | OUTPATIENT
Start: 2023-09-18

## 2023-09-18 RX ORDER — NALOXONE HYDROCHLORIDE 0.4 MG/ML
0.2 INJECTION, SOLUTION INTRAMUSCULAR; INTRAVENOUS; SUBCUTANEOUS
Status: CANCELLED | OUTPATIENT
Start: 2023-09-18

## 2023-09-18 RX ORDER — KETOROLAC TROMETHAMINE 30 MG/ML
30 INJECTION, SOLUTION INTRAMUSCULAR; INTRAVENOUS
Status: CANCELLED | OUTPATIENT
Start: 2023-09-18 | End: 2023-09-23

## 2023-09-18 RX ORDER — PROCHLORPERAZINE MALEATE 10 MG
10 TABLET ORAL EVERY 6 HOURS PRN
Status: CANCELLED | OUTPATIENT
Start: 2023-09-18

## 2023-09-18 RX ORDER — PROCHLORPERAZINE 25 MG
25 SUPPOSITORY, RECTAL RECTAL EVERY 12 HOURS PRN
Status: CANCELLED | OUTPATIENT
Start: 2023-09-18

## 2023-09-18 RX ORDER — IBUPROFEN 200 MG
800 TABLET ORAL
Status: CANCELLED | OUTPATIENT
Start: 2023-09-18 | End: 2023-09-23

## 2023-09-18 RX ORDER — CARBOPROST TROMETHAMINE 250 UG/ML
250 INJECTION, SOLUTION INTRAMUSCULAR
Status: CANCELLED | OUTPATIENT
Start: 2023-09-18

## 2023-09-18 RX ORDER — LIDOCAINE 40 MG/G
CREAM TOPICAL
Status: CANCELLED | OUTPATIENT
Start: 2023-09-18

## 2023-09-18 RX ORDER — METOCLOPRAMIDE 10 MG/1
10 TABLET ORAL EVERY 6 HOURS PRN
Status: CANCELLED | OUTPATIENT
Start: 2023-09-18

## 2023-09-19 ENCOUNTER — ANESTHESIA EVENT (OUTPATIENT)
Dept: OBGYN | Facility: OTHER | Age: 33
End: 2023-09-19

## 2023-09-19 ENCOUNTER — HOSPITAL ENCOUNTER (INPATIENT)
Facility: OTHER | Age: 33
LOS: 1 days | Discharge: HOME | End: 2023-09-20
Attending: STUDENT IN AN ORGANIZED HEALTH CARE EDUCATION/TRAINING PROGRAM | Admitting: STUDENT IN AN ORGANIZED HEALTH CARE EDUCATION/TRAINING PROGRAM
Payer: COMMERCIAL

## 2023-09-19 ENCOUNTER — ANESTHESIA EVENT (OUTPATIENT)
Dept: OBGYN | Facility: OTHER | Age: 33
End: 2023-09-19
Payer: COMMERCIAL

## 2023-09-19 ENCOUNTER — ANESTHESIA (OUTPATIENT)
Dept: OBGYN | Facility: OTHER | Age: 33
End: 2023-09-19

## 2023-09-19 ENCOUNTER — ANESTHESIA (OUTPATIENT)
Dept: OBGYN | Facility: OTHER | Age: 33
End: 2023-09-19
Payer: COMMERCIAL

## 2023-09-19 DIAGNOSIS — Z34.93 THIRD TRIMESTER PREGNANCY: Primary | ICD-10-CM

## 2023-09-19 LAB
ABO/RH(D): NORMAL
ANTIBODY SCREEN: NEGATIVE
ERYTHROCYTE [DISTWIDTH] IN BLOOD BY AUTOMATED COUNT: 14.1 % (ref 10–15)
HCT VFR BLD AUTO: 31.3 % (ref 35–47)
HGB BLD-MCNC: 10.6 G/DL (ref 11.7–15.7)
HOLD SPECIMEN: NORMAL
HOLD SPECIMEN: NORMAL
MCH RBC QN AUTO: 29.9 PG (ref 26.5–33)
MCHC RBC AUTO-ENTMCNC: 33.9 G/DL (ref 31.5–36.5)
MCV RBC AUTO: 88 FL (ref 78–100)
PLATELET # BLD AUTO: 112 10E3/UL (ref 150–450)
PLATELET # BLD AUTO: 118 10E3/UL (ref 150–450)
RBC # BLD AUTO: 3.55 10E6/UL (ref 3.8–5.2)
SPECIMEN EXPIRATION DATE: NORMAL
T PALLIDUM AB SER QL: NONREACTIVE
WBC # BLD AUTO: 8 10E3/UL (ref 4–11)

## 2023-09-19 PROCEDURE — 120N000001 HC R&B MED SURG/OB

## 2023-09-19 PROCEDURE — 3E0E77Z INTRODUCTION OF ELECTROLYTIC AND WATER BALANCE SUBSTANCE INTO PRODUCTS OF CONCEPTION, VIA NATURAL OR ARTIFICIAL OPENING: ICD-10-PCS | Performed by: STUDENT IN AN ORGANIZED HEALTH CARE EDUCATION/TRAINING PROGRAM

## 2023-09-19 PROCEDURE — 59400 OBSTETRICAL CARE: CPT | Performed by: STUDENT IN AN ORGANIZED HEALTH CARE EDUCATION/TRAINING PROGRAM

## 2023-09-19 PROCEDURE — 250N000013 HC RX MED GY IP 250 OP 250 PS 637: Performed by: STUDENT IN AN ORGANIZED HEALTH CARE EDUCATION/TRAINING PROGRAM

## 2023-09-19 PROCEDURE — 59400 OBSTETRICAL CARE: CPT | Performed by: NURSE ANESTHETIST, CERTIFIED REGISTERED

## 2023-09-19 PROCEDURE — 370N000003 HC ANESTHESIA WARD SERVICE

## 2023-09-19 PROCEDURE — 85027 COMPLETE CBC AUTOMATED: CPT | Performed by: STUDENT IN AN ORGANIZED HEALTH CARE EDUCATION/TRAINING PROGRAM

## 2023-09-19 PROCEDURE — 250N000013 HC RX MED GY IP 250 OP 250 PS 637: Performed by: NURSE ANESTHETIST, CERTIFIED REGISTERED

## 2023-09-19 PROCEDURE — 10907ZC DRAINAGE OF AMNIOTIC FLUID, THERAPEUTIC FROM PRODUCTS OF CONCEPTION, VIA NATURAL OR ARTIFICIAL OPENING: ICD-10-PCS | Performed by: STUDENT IN AN ORGANIZED HEALTH CARE EDUCATION/TRAINING PROGRAM

## 2023-09-19 PROCEDURE — 258N000003 HC RX IP 258 OP 636: Performed by: STUDENT IN AN ORGANIZED HEALTH CARE EDUCATION/TRAINING PROGRAM

## 2023-09-19 PROCEDURE — 250N000011 HC RX IP 250 OP 636: Performed by: NURSE ANESTHETIST, CERTIFIED REGISTERED

## 2023-09-19 PROCEDURE — 85049 AUTOMATED PLATELET COUNT: CPT | Performed by: STUDENT IN AN ORGANIZED HEALTH CARE EDUCATION/TRAINING PROGRAM

## 2023-09-19 PROCEDURE — 250N000011 HC RX IP 250 OP 636: Mod: JZ | Performed by: STUDENT IN AN ORGANIZED HEALTH CARE EDUCATION/TRAINING PROGRAM

## 2023-09-19 PROCEDURE — 722N000001 HC LABOR CARE VAGINAL DELIVERY SINGLE

## 2023-09-19 PROCEDURE — 250N000009 HC RX 250: Performed by: STUDENT IN AN ORGANIZED HEALTH CARE EDUCATION/TRAINING PROGRAM

## 2023-09-19 PROCEDURE — 36415 COLL VENOUS BLD VENIPUNCTURE: CPT | Performed by: STUDENT IN AN ORGANIZED HEALTH CARE EDUCATION/TRAINING PROGRAM

## 2023-09-19 PROCEDURE — 3E033VJ INTRODUCTION OF OTHER HORMONE INTO PERIPHERAL VEIN, PERCUTANEOUS APPROACH: ICD-10-PCS | Performed by: STUDENT IN AN ORGANIZED HEALTH CARE EDUCATION/TRAINING PROGRAM

## 2023-09-19 PROCEDURE — 86780 TREPONEMA PALLIDUM: CPT | Performed by: STUDENT IN AN ORGANIZED HEALTH CARE EDUCATION/TRAINING PROGRAM

## 2023-09-19 PROCEDURE — 86850 RBC ANTIBODY SCREEN: CPT | Performed by: STUDENT IN AN ORGANIZED HEALTH CARE EDUCATION/TRAINING PROGRAM

## 2023-09-19 RX ORDER — LIDOCAINE HYDROCHLORIDE AND EPINEPHRINE 15; 5 MG/ML; UG/ML
3 INJECTION, SOLUTION EPIDURAL
Status: DISCONTINUED | OUTPATIENT
Start: 2023-09-19 | End: 2023-09-19 | Stop reason: HOSPADM

## 2023-09-19 RX ORDER — NALOXONE HYDROCHLORIDE 0.4 MG/ML
0.2 INJECTION, SOLUTION INTRAMUSCULAR; INTRAVENOUS; SUBCUTANEOUS
Status: DISCONTINUED | OUTPATIENT
Start: 2023-09-19 | End: 2023-09-19 | Stop reason: HOSPADM

## 2023-09-19 RX ORDER — ACETAMINOPHEN 325 MG/1
650 TABLET ORAL EVERY 4 HOURS PRN
Status: DISCONTINUED | OUTPATIENT
Start: 2023-09-19 | End: 2023-09-19 | Stop reason: HOSPADM

## 2023-09-19 RX ORDER — TRANEXAMIC ACID 10 MG/ML
1 INJECTION, SOLUTION INTRAVENOUS EVERY 30 MIN PRN
Status: DISCONTINUED | OUTPATIENT
Start: 2023-09-19 | End: 2023-09-20 | Stop reason: HOSPADM

## 2023-09-19 RX ORDER — OXYTOCIN/0.9 % SODIUM CHLORIDE 30/500 ML
100-340 PLASTIC BAG, INJECTION (ML) INTRAVENOUS CONTINUOUS PRN
Status: DISCONTINUED | OUTPATIENT
Start: 2023-09-19 | End: 2023-09-19

## 2023-09-19 RX ORDER — ONDANSETRON 2 MG/ML
4 INJECTION INTRAMUSCULAR; INTRAVENOUS EVERY 6 HOURS PRN
Status: DISCONTINUED | OUTPATIENT
Start: 2023-09-19 | End: 2023-09-19 | Stop reason: HOSPADM

## 2023-09-19 RX ORDER — MISOPROSTOL 100 UG/1
400 TABLET ORAL
Status: DISCONTINUED | OUTPATIENT
Start: 2023-09-19 | End: 2023-09-19 | Stop reason: HOSPADM

## 2023-09-19 RX ORDER — SODIUM CHLORIDE, SODIUM LACTATE, POTASSIUM CHLORIDE, CALCIUM CHLORIDE 600; 310; 30; 20 MG/100ML; MG/100ML; MG/100ML; MG/100ML
INJECTION, SOLUTION INTRAVENOUS CONTINUOUS PRN
Status: DISCONTINUED | OUTPATIENT
Start: 2023-09-19 | End: 2023-09-19 | Stop reason: HOSPADM

## 2023-09-19 RX ORDER — METHYLERGONOVINE MALEATE 0.2 MG/ML
200 INJECTION INTRAVENOUS
Status: DISCONTINUED | OUTPATIENT
Start: 2023-09-19 | End: 2023-09-19 | Stop reason: HOSPADM

## 2023-09-19 RX ORDER — OXYTOCIN/0.9 % SODIUM CHLORIDE 30/500 ML
340 PLASTIC BAG, INJECTION (ML) INTRAVENOUS CONTINUOUS PRN
Status: COMPLETED | OUTPATIENT
Start: 2023-09-19 | End: 2023-09-19

## 2023-09-19 RX ORDER — IBUPROFEN 400 MG/1
800 TABLET, FILM COATED ORAL EVERY 6 HOURS PRN
Status: DISCONTINUED | OUTPATIENT
Start: 2023-09-19 | End: 2023-09-20 | Stop reason: HOSPADM

## 2023-09-19 RX ORDER — OXYTOCIN/0.9 % SODIUM CHLORIDE 30/500 ML
1-24 PLASTIC BAG, INJECTION (ML) INTRAVENOUS CONTINUOUS
Status: DISCONTINUED | OUTPATIENT
Start: 2023-09-19 | End: 2023-09-19 | Stop reason: HOSPADM

## 2023-09-19 RX ORDER — METOCLOPRAMIDE 10 MG/1
10 TABLET ORAL EVERY 6 HOURS PRN
Status: DISCONTINUED | OUTPATIENT
Start: 2023-09-19 | End: 2023-09-19 | Stop reason: HOSPADM

## 2023-09-19 RX ORDER — FENTANYL CITRATE 50 UG/ML
100 INJECTION, SOLUTION INTRAMUSCULAR; INTRAVENOUS
Status: DISCONTINUED | OUTPATIENT
Start: 2023-09-19 | End: 2023-09-19 | Stop reason: HOSPADM

## 2023-09-19 RX ORDER — HYDROCORTISONE 25 MG/G
CREAM TOPICAL 3 TIMES DAILY PRN
Status: DISCONTINUED | OUTPATIENT
Start: 2023-09-19 | End: 2023-09-20 | Stop reason: HOSPADM

## 2023-09-19 RX ORDER — ONDANSETRON 4 MG/1
4 TABLET, ORALLY DISINTEGRATING ORAL EVERY 6 HOURS PRN
Status: DISCONTINUED | OUTPATIENT
Start: 2023-09-19 | End: 2023-09-19 | Stop reason: HOSPADM

## 2023-09-19 RX ORDER — PENICILLIN G 3000000 [IU]/50ML
3 INJECTION, SOLUTION INTRAVENOUS EVERY 4 HOURS
Status: DISCONTINUED | OUTPATIENT
Start: 2023-09-19 | End: 2023-09-19 | Stop reason: HOSPADM

## 2023-09-19 RX ORDER — NALOXONE HYDROCHLORIDE 0.4 MG/ML
0.4 INJECTION, SOLUTION INTRAMUSCULAR; INTRAVENOUS; SUBCUTANEOUS
Status: DISCONTINUED | OUTPATIENT
Start: 2023-09-19 | End: 2023-09-19 | Stop reason: HOSPADM

## 2023-09-19 RX ORDER — ACETAMINOPHEN 325 MG/1
650 TABLET ORAL EVERY 4 HOURS PRN
Status: DISCONTINUED | OUTPATIENT
Start: 2023-09-19 | End: 2023-09-20 | Stop reason: HOSPADM

## 2023-09-19 RX ORDER — OXYTOCIN 10 [USP'U]/ML
10 INJECTION, SOLUTION INTRAMUSCULAR; INTRAVENOUS
Status: DISCONTINUED | OUTPATIENT
Start: 2023-09-19 | End: 2023-09-19

## 2023-09-19 RX ORDER — METOCLOPRAMIDE HYDROCHLORIDE 5 MG/ML
10 INJECTION INTRAMUSCULAR; INTRAVENOUS EVERY 6 HOURS PRN
Status: DISCONTINUED | OUTPATIENT
Start: 2023-09-19 | End: 2023-09-19 | Stop reason: HOSPADM

## 2023-09-19 RX ORDER — IBUPROFEN 400 MG/1
800 TABLET, FILM COATED ORAL
Status: DISCONTINUED | OUTPATIENT
Start: 2023-09-19 | End: 2023-09-19

## 2023-09-19 RX ORDER — MODIFIED LANOLIN
OINTMENT (GRAM) TOPICAL
Status: DISCONTINUED | OUTPATIENT
Start: 2023-09-19 | End: 2023-09-20 | Stop reason: HOSPADM

## 2023-09-19 RX ORDER — CITRIC ACID/SODIUM CITRATE 334-500MG
30 SOLUTION, ORAL ORAL
Status: DISCONTINUED | OUTPATIENT
Start: 2023-09-19 | End: 2023-09-19 | Stop reason: HOSPADM

## 2023-09-19 RX ORDER — OXYTOCIN 10 [USP'U]/ML
10 INJECTION, SOLUTION INTRAMUSCULAR; INTRAVENOUS
Status: DISCONTINUED | OUTPATIENT
Start: 2023-09-19 | End: 2023-09-20 | Stop reason: HOSPADM

## 2023-09-19 RX ORDER — OXYTOCIN/0.9 % SODIUM CHLORIDE 30/500 ML
340 PLASTIC BAG, INJECTION (ML) INTRAVENOUS CONTINUOUS PRN
Status: DISCONTINUED | OUTPATIENT
Start: 2023-09-19 | End: 2023-09-20 | Stop reason: HOSPADM

## 2023-09-19 RX ORDER — CARBOPROST TROMETHAMINE 250 UG/ML
250 INJECTION, SOLUTION INTRAMUSCULAR
Status: DISCONTINUED | OUTPATIENT
Start: 2023-09-19 | End: 2023-09-20 | Stop reason: HOSPADM

## 2023-09-19 RX ORDER — PENICILLIN G POTASSIUM 5000000 [IU]/1
5 INJECTION, POWDER, FOR SOLUTION INTRAMUSCULAR; INTRAVENOUS ONCE
Status: COMPLETED | OUTPATIENT
Start: 2023-09-19 | End: 2023-09-19

## 2023-09-19 RX ORDER — SODIUM CHLORIDE 9 MG/ML
INJECTION, SOLUTION INTRAVENOUS CONTINUOUS
Status: DISCONTINUED | OUTPATIENT
Start: 2023-09-19 | End: 2023-09-19 | Stop reason: HOSPADM

## 2023-09-19 RX ORDER — TRANEXAMIC ACID 10 MG/ML
1 INJECTION, SOLUTION INTRAVENOUS EVERY 30 MIN PRN
Status: DISCONTINUED | OUTPATIENT
Start: 2023-09-19 | End: 2023-09-19 | Stop reason: HOSPADM

## 2023-09-19 RX ORDER — PROCHLORPERAZINE 25 MG
25 SUPPOSITORY, RECTAL RECTAL EVERY 12 HOURS PRN
Status: DISCONTINUED | OUTPATIENT
Start: 2023-09-19 | End: 2023-09-19 | Stop reason: HOSPADM

## 2023-09-19 RX ORDER — KETOROLAC TROMETHAMINE 30 MG/ML
30 INJECTION, SOLUTION INTRAMUSCULAR; INTRAVENOUS
Status: DISCONTINUED | OUTPATIENT
Start: 2023-09-19 | End: 2023-09-19

## 2023-09-19 RX ORDER — LIDOCAINE 40 MG/G
CREAM TOPICAL
Status: DISCONTINUED | OUTPATIENT
Start: 2023-09-19 | End: 2023-09-19 | Stop reason: HOSPADM

## 2023-09-19 RX ORDER — METHYLERGONOVINE MALEATE 0.2 MG/ML
200 INJECTION INTRAVENOUS
Status: DISCONTINUED | OUTPATIENT
Start: 2023-09-19 | End: 2023-09-20 | Stop reason: HOSPADM

## 2023-09-19 RX ORDER — BISACODYL 10 MG
10 SUPPOSITORY, RECTAL RECTAL DAILY PRN
Status: DISCONTINUED | OUTPATIENT
Start: 2023-09-19 | End: 2023-09-20 | Stop reason: HOSPADM

## 2023-09-19 RX ORDER — MISOPROSTOL 100 UG/1
400 TABLET ORAL
Status: DISCONTINUED | OUTPATIENT
Start: 2023-09-19 | End: 2023-09-20 | Stop reason: HOSPADM

## 2023-09-19 RX ORDER — SIMETHICONE 80 MG
80 TABLET,CHEWABLE ORAL EVERY 6 HOURS PRN
Status: DISCONTINUED | OUTPATIENT
Start: 2023-09-19 | End: 2023-09-20 | Stop reason: HOSPADM

## 2023-09-19 RX ORDER — CITRIC ACID/SODIUM CITRATE 334-500MG
30 SOLUTION, ORAL ORAL ONCE
Status: COMPLETED | OUTPATIENT
Start: 2023-09-19 | End: 2023-09-19

## 2023-09-19 RX ORDER — CARBOPROST TROMETHAMINE 250 UG/ML
250 INJECTION, SOLUTION INTRAMUSCULAR
Status: DISCONTINUED | OUTPATIENT
Start: 2023-09-19 | End: 2023-09-19 | Stop reason: HOSPADM

## 2023-09-19 RX ORDER — PROCHLORPERAZINE MALEATE 10 MG
10 TABLET ORAL EVERY 6 HOURS PRN
Status: DISCONTINUED | OUTPATIENT
Start: 2023-09-19 | End: 2023-09-19 | Stop reason: HOSPADM

## 2023-09-19 RX ORDER — OXYTOCIN 10 [USP'U]/ML
10 INJECTION, SOLUTION INTRAMUSCULAR; INTRAVENOUS
Status: DISCONTINUED | OUTPATIENT
Start: 2023-09-19 | End: 2023-09-19 | Stop reason: HOSPADM

## 2023-09-19 RX ORDER — NALBUPHINE HYDROCHLORIDE 10 MG/ML
2.5-5 INJECTION, SOLUTION INTRAMUSCULAR; INTRAVENOUS; SUBCUTANEOUS EVERY 6 HOURS PRN
Status: DISCONTINUED | OUTPATIENT
Start: 2023-09-19 | End: 2023-09-19

## 2023-09-19 RX ORDER — FENTANYL CITRATE-0.9 % NACL/PF 10 MCG/ML
100 PLASTIC BAG, INJECTION (ML) INTRAVENOUS EVERY 5 MIN PRN
Status: DISCONTINUED | OUTPATIENT
Start: 2023-09-19 | End: 2023-09-19 | Stop reason: HOSPADM

## 2023-09-19 RX ADMIN — SODIUM CHLORIDE, POTASSIUM CHLORIDE, SODIUM LACTATE AND CALCIUM CHLORIDE: 600; 310; 30; 20 INJECTION, SOLUTION INTRAVENOUS at 10:00

## 2023-09-19 RX ADMIN — SODIUM CHLORIDE, POTASSIUM CHLORIDE, SODIUM LACTATE AND CALCIUM CHLORIDE: 600; 310; 30; 20 INJECTION, SOLUTION INTRAVENOUS at 06:43

## 2023-09-19 RX ADMIN — PENICILLIN G 3 MILLION UNITS: 3000000 INJECTION, SOLUTION INTRAVENOUS at 15:20

## 2023-09-19 RX ADMIN — SODIUM CHLORIDE, POTASSIUM CHLORIDE, SODIUM LACTATE AND CALCIUM CHLORIDE 1000 ML: 600; 310; 30; 20 INJECTION, SOLUTION INTRAVENOUS at 15:35

## 2023-09-19 RX ADMIN — SODIUM CHLORIDE 250 ML: 9 INJECTION, SOLUTION INTRAVENOUS at 15:35

## 2023-09-19 RX ADMIN — Medication 340 ML/HR: at 16:02

## 2023-09-19 RX ADMIN — SODIUM CHLORIDE, POTASSIUM CHLORIDE, SODIUM LACTATE AND CALCIUM CHLORIDE: 600; 310; 30; 20 INJECTION, SOLUTION INTRAVENOUS at 13:32

## 2023-09-19 RX ADMIN — ACETAMINOPHEN 650 MG: 325 TABLET, FILM COATED ORAL at 20:06

## 2023-09-19 RX ADMIN — SODIUM CITRATE AND CITRIC ACID MONOHYDRATE 30 ML: 500; 334 SOLUTION ORAL at 13:30

## 2023-09-19 RX ADMIN — SODIUM CHLORIDE: 9 INJECTION, SOLUTION INTRAVENOUS at 15:48

## 2023-09-19 RX ADMIN — Medication 10 ML/HR: at 13:34

## 2023-09-19 RX ADMIN — PENICILLIN G 3 MILLION UNITS: 3000000 INJECTION, SOLUTION INTRAVENOUS at 11:04

## 2023-09-19 RX ADMIN — IBUPROFEN 800 MG: 400 TABLET ORAL at 18:04

## 2023-09-19 RX ADMIN — PENICILLIN G POTASSIUM 5 MILLION UNITS: 5000000 POWDER, FOR SOLUTION INTRAMUSCULAR; INTRAPLEURAL; INTRATHECAL; INTRAVENOUS at 06:48

## 2023-09-19 RX ADMIN — Medication 2 MILLI-UNITS/MIN: at 06:43

## 2023-09-19 ASSESSMENT — ACTIVITIES OF DAILY LIVING (ADL)
ADLS_ACUITY_SCORE: 20
ADLS_ACUITY_SCORE: 24
CHANGE_IN_FUNCTIONAL_STATUS_SINCE_ONSET_OF_CURRENT_ILLNESS/INJURY: NO
WALKING_OR_CLIMBING_STAIRS_DIFFICULTY: NO
ADLS_ACUITY_SCORE: 24
TOILETING_ISSUES: NO
CONCENTRATING,_REMEMBERING_OR_MAKING_DECISIONS_DIFFICULTY: NO
ADLS_ACUITY_SCORE: 24
DIFFICULTY_EATING/SWALLOWING: NO
FALL_HISTORY_WITHIN_LAST_SIX_MONTHS: NO
ADLS_ACUITY_SCORE: 24
ADLS_ACUITY_SCORE: 24
WEAR_GLASSES_OR_BLIND: YES
DOING_ERRANDS_INDEPENDENTLY_DIFFICULTY: NO
DRESSING/BATHING_DIFFICULTY: NO
ADLS_ACUITY_SCORE: 20

## 2023-09-19 ASSESSMENT — LIFESTYLE VARIABLES: TOBACCO_USE: 1

## 2023-09-19 NOTE — PROGRESS NOTES
of a viable male  born at 1601 with apgars of 8 & 9 born over an intact perineum. To mothers chest. Cord clamped. Pinking up with tactile stimulation. Vigorous. Crying. Bulb suction to nose and mouth. Breastfeeding at 15 minutes of life.     Saúl Hartman RN 23 5:14 PM

## 2023-09-19 NOTE — ANESTHESIA PREPROCEDURE EVALUATION
Anesthesia Pre-Procedure Evaluation    Patient: Genet Jaimes   MRN: [id number] : 1990        Procedure :           Past Medical History:   Diagnosis Date    Postpartum depression     Seizures (H)       Past Surgical History:   Procedure Laterality Date    Foot deformity        Allergies   Allergen Reactions    Red Dye Itching      Social History     Tobacco Use    Smoking status: Former     Types: Cigarettes     Quit date: 2016     Years since quittin.6    Smokeless tobacco: Never   Substance Use Topics    Alcohol use: Not Currently      Wt Readings from Last 1 Encounters:   23 85.3 kg (188 lb)        Anesthesia Evaluation   Pt has had prior anesthetic. Type: Regional.    History of anesthetic complications  - .  Pt states she had an epidural 12 years ago and she was numb for 3 days. 2nd epidural was one sided.    ROS/MED HX  ENT/Pulmonary:  - neg pulmonary ROS   (+)                tobacco use, Past use,                      Neurologic:  - neg neurologic ROS     Cardiovascular:  - neg cardiovascular ROS     METS/Exercise Tolerance: >4 METS    Hematologic: Comments: Platelets 118 and stable. Pt denies any bleeding per gums, denies bruising - neg hematologic  ROS     Musculoskeletal:  - neg musculoskeletal ROS     GI/Hepatic:  - neg GI/hepatic ROS     Renal/Genitourinary:  - neg Renal ROS     Endo:  - neg endo ROS     Psychiatric/Substance Use:  - neg psychiatric ROS     Infectious Disease:  - neg infectious disease ROS     Malignancy:  - neg malignancy ROS     Other:  - neg other ROS          Physical Exam    Airway        Mallampati: I   TM distance: > 3 FB   Neck ROM: full   Mouth opening: > 3 cm    Respiratory Devices and Support         Dental       (+) Completely normal teeth      Cardiovascular   cardiovascular exam normal          Pulmonary   pulmonary exam normal                OUTSIDE LABS:  CBC:   Lab Results   Component Value Date    WBC 8.0 2023    WBC 9.1 2023     HGB 10.6 (L) 09/19/2023    HGB 10.9 (L) 06/21/2023    HCT 31.3 (L) 09/19/2023    HCT 32.4 (L) 06/21/2023     (L) 09/19/2023     (L) 09/19/2023     BMP:   Lab Results   Component Value Date     01/09/2021    POTASSIUM 4.1 01/09/2021    CHLORIDE 103 01/09/2021    CO2 27 01/09/2021    BUN 13 01/09/2021    CR 0.63 01/09/2021     01/09/2021     COAGS: No results found for: PTT, INR, FIBR  POC: No results found for: BGM, HCG, HCGS  HEPATIC: No results found for: ALBUMIN, PROTTOTAL, ALT, AST, GGT, ALKPHOS, BILITOTAL, BILIDIRECT, NEIL  OTHER:   Lab Results   Component Value Date    CRESCENCIO 9.0 01/09/2021    CRP 12.3 (H) 01/09/2021       Anesthesia Plan    ASA Status:  2    NPO Status:  NPO Appropriate    Anesthesia Type: Epidural.              Consents    Anesthesia Plan(s) and associated risks, benefits, and realistic alternatives discussed. Questions answered and patient/representative(s) expressed understanding.     - Discussed: Risks, Benefits and Alternatives for the PROCEDURE were discussed     - Discussed with:  Patient            Postoperative Care            Comments:    Other Comments: Explained to pt that I did not know the cause of her prolonged epidural.  She still wants to proceed with the epidural.  She is not in pain at this time so she wants to get it in while she is comfortable and start it up when she is starting to feel more pain etc.        neg OB ROS.       ESTELA ARMIJO, ZACHARY CRNA

## 2023-09-19 NOTE — PROGRESS NOTES
Spoke with Monica BARFIELD regarding epidural. Orders to pull epidural, now that pt is delivered. CRNA does not feel like any new lab orders are needed at this time.

## 2023-09-19 NOTE — PROGRESS NOTES
"Labor Progress Note    Ms. Jaimes is feeling comfortable and not in any pain. She has not needed any pain medications. She is on 12 of pitocin.      Exam:  /78   Pulse 70   Temp (!) 96  F (35.6  C) (Temporal)   Resp 18   Ht 1.689 m (5' 6.5\")   Wt 85.3 kg (188 lb)   LMP 2022 (Approximate)   SpO2 99%   Breastfeeding No   BMI 29.89 kg/m     Gen: comfortable    FHT: 140 bpm, moderate variability, +accels, no decels (Cat )    [place]: 3-5 contractions in 10 min    Membranes: AROM clear fluid at 12:00 pm    SVE /-2      Assessment & Plan:  Ms. Jaimes is a 33 year old  at 40w6d by LMP=11 week US admitted for IOL.    FWB: Cat 1  Labor: Pitocin currently running at 12mU/min  Pain: epidural catheter in place- not yet running  GBS: positive, PCN adequate  Rh: positive  Rubella: immune  Continue routine labor management.   Anticipate     Faith Bello MD 12:07 PM     -------------------------------------------  Clinical update 3:20 pm    Responded to bedside for persistent variable deceleration and deepening nadirs. We have stopped the pitocin and respositioned. IUPC placed and amnioinfusion started. FSE also placed.    Maternal oxygen, repositioning. Plan to continued to observe with pitocin off and can restart it at a later time if able.    SVE -/-2    Faith Bello MD on 2023 at 3:29 PM            "

## 2023-09-19 NOTE — L&D DELIVERY NOTE
Vaginal Delivery Note    Ms. Jaimes presented to labor and delivery for IOL as she is nearing late term.     Her labor was augmented with pitocin and pain control offered via an epidural. During the second stage of labor she pushed to deliver a baby boy. The baby delivered in a direct OA manner and then restituted in a clockwise manner to face maternal right leg. With gentle downward guidance the anterior, right shoulder was easily delivered. The posterior shoulder quickly followed. He immediately started crying and after back stimulation was vigorously crying. The umbilical cord was then clamped and cut without difficulty by the father. The baby was handed up on to mom's chest.     Active management of the third stage of labor included gentle downward traction on the umbilical cord and suprapubic pressure to ensure no uterine inversion. A small gush of blood signaled placental separation and shortly after that the placenta delivered intact without difficulty. At this time pitocin was started. Uterine fundal massage as well as bimanual exam revealed a firm uterus. A fundal sweep performed revealed some blood clots in the uterine cavity that were manually extracted. There were no remaining placental parts or membranes. Good hemostasis was noted.    An examination of the vaginal mucosa and perineum revealed an intact perineium. No repair was needed. Both Genet and her son, Kaladin, tolerated the delivery well and were recovering in the delivery room together.     Faith Bello MD on 9/19/2023 at 4:21 PM

## 2023-09-19 NOTE — PROGRESS NOTES
AROM at 1203 per MD. Clear, moderate fluid. SVE 4/80/-2.     Saúl Hartman RN 09/19/23 12:15 PM

## 2023-09-19 NOTE — PROGRESS NOTES
Recurrent variables. MD aware. At bedside and new orders for amnioinfusion per MD.     -Amnioinfusion bolus at a rate of 250 mL/hr  -Maintenance rate of 100 mL/hr thereafter until delivery.     Intake was equal to output at delivery.

## 2023-09-19 NOTE — PROGRESS NOTES
Epidural started by Monica BARFIELD per patients request. Rates pain 3/10 with contraction. Education completed with patient.     Saúl Hartman RN 09/19/23 1:37 PM

## 2023-09-19 NOTE — ANESTHESIA PROCEDURE NOTES
"Epidural catheter Procedure Note    Pre-Procedure   Staff -        CRNA: Monica Haider APRN CRNA       Performed By: CRNA       Location: OB       Procedure Start/Stop Times: 9/19/2023 10:34 AM and 9/19/2023 10:45 AM       Pre-Anesthestic Checklist: patient identified, IV checked, risks and benefits discussed, informed consent, monitors and equipment checked, pre-op evaluation and at physician/surgeon's request  Timeout:       Correct Patient: Yes        Correct Procedure: Yes        Correct Site: Yes        Correct Position: Yes   Procedure Documentation  Procedure: epidural catheter       Diagnosis: labor       Patient Position: sitting       Patient Prep/Sterile Barriers: sterile gloves, mask, patient draped       Skin prep: Chloraprep       Local skin infiltrated with mL of 2% lidocaine.        Insertion Site: L3-4. (midline approach).       Technique: LORT saline        KHADIJAH at 4.5 cm.       Needle Type: Edhuby needle       Needle Gauge: 17.        Needle Length (Inches): 3.5        Catheter: 19 G.          Catheter threaded easily.             # of attempts: 1 and  # of redirects:     Assessment/Narrative         Paresthesias: No.       Test dose of 5 mL lidocaine 1.5% w/ 1:200,000 epinephrine at 10:46 CDT.         Test dose negative, 3 minutes after injection, for signs of intravascular, subdural, or intrathecal injection.       Insertion/Infusion Method: LORT saline       Aspiration negative for Heme or CSF via Epidural Catheter.    Medication(s) Administered   Medication Administration Time: 9/19/2023 10:34 AM     Comments:  Pt does not want epidural drip started as of yet.  Test dose negative. Pt states her feet are both warm after test dose      FOR [facility] ([facility] [place]) ONLY:   Pain Team Contact information: please page the Pain Team Via [employer]. Search \"Pain\". During daytime hours, please page the attending first. At night please page the resident first.      "

## 2023-09-19 NOTE — H&P
[facility] Labor and Delivery Admission H&P    Genet Jaimes MRN# [id number]   Age: 33 year old YOB: 1990     Date of Admission:  2023    Primary care provider: No Ref-Primary, Physician           Chief Complaint:   Genet Jaimes is a 33 year old  at 40w6d by LMP = 11 week US admitted for IOL.          Pregnancy history:   This pregnancy has been uncomplicated.      OBSTETRIC HISTORY:    OB History    Para Term  AB Living   3 2 2 0 0 2   SAB IAB Ectopic Multiple Live Births   0 0 0 0 2      # Outcome Date GA Lbr Dion/2nd Weight Sex Delivery Anes PTL Lv   3 Current            2 Term 10/17/19   2.948 kg (6 lb 8 oz) M Vag-Spont   BRANT      Name: Chano   1 Term 10/24/11    M Vag-Spont   BRANT      Name: Silvio       PRENATAL LABS:   Lab Results   Component Value Date    AS Negative 2023    HEPBANG Nonreactive 2023    HGB 10.6 (L) 2023         MEDICATIONS:  Medications Prior to Admission   Medication Sig Dispense Refill Last Dose    Cyanocobalamin (B-12) 1000 MCG TBCR    More than a month    docusate sodium (COLACE) 50 MG capsule Take 2 capsules (100 mg) by mouth 2 times daily as needed for constipation 60 capsule 0 More than a month    famotidine (PEPCID) 20 MG tablet Take 1 tablet (20 mg) by mouth 2 times daily as needed (acid reflux) 60 tablet 0 Past Month    metoclopramide (REGLAN) 10 MG tablet TAKE 1 TABLET BY MOUTH TWICE DAILY AS NEEDED FOR NAUSEA 20 tablet 0 Past Month    metoclopramide (REGLAN) 10 MG tablet Take 1 tablet (10 mg) by mouth 2 times daily as needed (nausea) 40 tablet 0 Past Month    Prenatal Vit-Fe Fumarate-FA (PRENATAL MULTIVITAMIN W/IRON) 27-0.8 MG tablet Take 1 tablet by mouth daily   2023    simethicone (MYLICON) 125 MG chewable tablet Take 1 tablet (125 mg) by mouth 2 times daily 30 tablet 1 Past Week   .        Maternal Past Medical History:     Past Medical History:   Diagnosis Date    Postpartum depression      "Seizures (H)      She denies any acute or chronic medical conditions  She specifically denies asthma, HTN, DM or history of VTE    SURGICAL HISTORY:  Past Surgical History:   Procedure Laterality Date    Foot deformity                  ALLERGIES:     Allergies   Allergen Reactions    Red Dye Itching             Social History:     Social History     Tobacco Use    Smoking status: Former     Types: Cigarettes     Quit date: 2016     Years since quittin.6    Smokeless tobacco: Never   Vaping Use    Vaping Use: Former    Substances: Nicotine, Flavoring    Devices: Disposable   Substance Use Topics    Alcohol use: Not Currently    Drug use: Never              Physical Exam:   Patient Vitals for the past 8 hrs:   BP Temp Temp src Pulse Resp SpO2 Height Weight   23 0843 -- -- -- -- -- 99 % -- --   23 0838 -- -- -- -- -- 98 % -- --   23 0836 123/65 -- -- -- -- -- -- --   23 0833 -- -- -- -- -- 98 % -- --   23 0830 -- -- -- -- -- 98 % -- --   23 0825 -- (!) 96  F (35.6  C) Temporal -- 18 -- -- --   23 0806 109/70 -- -- -- -- -- -- --   23 0751 -- -- -- -- -- 98 % -- --   23 0746 -- -- -- -- 16 98 % -- --   23 0741 -- -- -- -- -- 97 % -- --   23 0736 -- -- -- -- -- 99 % 1.689 m (5' 6.5\") 85.3 kg (188 lb)   23 0520 123/72 98  F (36.7  C) Temporal 70 16 99 % -- --     Gen: Alert and oriented, No acute distress, well-appearing  CV: Normal heart rate to mild tachycardia with labor, regular rhythm, no audible murmur or gallop  Resp: Lungs clear to auscultation, non-labored respirations  Abd: Soft, gravid, intermittent contractions palpated, no point tenderness  Ext: No sign of asymmetric edema, erythema, or asymmetric size. No pain with movement, Negative Simón's sign    Cervix: 2/50/-2  Membranes: intact  EFW by Leopolds: 3500 grams  Presentation:Cephalic    FHT: 140 bpm baseline, moderate  variability, + accelerations, no decelerations (Cat " 1)  [place]: 3-4 per 10 minutes        Assessment:   Genet Jaimes is a 33 year old  at 40w6d admitted for IOL, doing well. This pregnancy is uncomplicated.        Plan:     # Term IUP: Labor progress  -- SVE: /-2  -- [place]: 3-4 q10 min  -- Membranes: intact  -- Confirmed cephalic by bsus    # FWB  -- CEFM: 140 bpm baseline, moderate  variability, + accelerations, no decelerations (Cat 1)  -- EFW: 3500 gms by leopolds    # PNL              A+, Ab screen neg              RPR nr              Hep B S Ag neg              Hep C neg              Rubella immune              HIV neg     -- Genetic Screening: negative carrier screen, low risk XY-- Kaladin Joseph  -- Anatomy US: 55%ile normal anatomy  -- Immunizations: Tdap   -- 3rd TM labs including CBC, RPR: RPR nr, Hgb 10.96, Plt 153  -- 1 hr GTT: 109  -- GBS: positive: PCN running    # Obstetric history  --     x2    # PP Planning  -- Plans to breastfeed     # Pain  -- IV pain meds and epidural prn  -- She desires an epidural later in labor    # Plan  -- Admit to labor and delivery  -- Continue induction with pitocin  -- AROM after PCN is adequate  -- SVE as clinically indicated  -- Anticipate       Faith Bello MD on 2023 at 9:00 AM

## 2023-09-19 NOTE — PROGRESS NOTES
Epidural timeout completed at 1041.     Epidural completed. Patient and anesthesia agreeable to not turning epidural on at this time. Fluid bolus completed. Epidural tubing primed and connected. Pump off. Will have CRNA complete double check and turn pump on at patients request.     Patient tolerated without difficulties.     Once patient is requesting epidural to be turned back on:  -250 mL bolus    Current orders:  -Routine epidural vitals   -Bedrest  -Bed pan for voiding  -Call CRNA for start    Saúl Hartman RN 09/19/23 11:38 AM

## 2023-09-19 NOTE — PROGRESS NOTES
Pt arrived to [facility] at 0505 for scheduled induction with Dr. Mcclellan. Support person, Christie rosas. EFM/toco applied. Admit completed. SVE 2/50. VSS.

## 2023-09-20 VITALS
RESPIRATION RATE: 16 BRPM | HEART RATE: 78 BPM | BODY MASS INDEX: 29.51 KG/M2 | OXYGEN SATURATION: 99 % | WEIGHT: 188 LBS | SYSTOLIC BLOOD PRESSURE: 119 MMHG | DIASTOLIC BLOOD PRESSURE: 75 MMHG | HEIGHT: 67 IN | TEMPERATURE: 97.2 F

## 2023-09-20 LAB — HGB BLD-MCNC: 9.8 G/DL (ref 11.7–15.7)

## 2023-09-20 PROCEDURE — 36415 COLL VENOUS BLD VENIPUNCTURE: CPT | Performed by: STUDENT IN AN ORGANIZED HEALTH CARE EDUCATION/TRAINING PROGRAM

## 2023-09-20 PROCEDURE — 250N000013 HC RX MED GY IP 250 OP 250 PS 637: Performed by: STUDENT IN AN ORGANIZED HEALTH CARE EDUCATION/TRAINING PROGRAM

## 2023-09-20 PROCEDURE — 85018 HEMOGLOBIN: CPT | Performed by: STUDENT IN AN ORGANIZED HEALTH CARE EDUCATION/TRAINING PROGRAM

## 2023-09-20 RX ADMIN — IBUPROFEN 800 MG: 400 TABLET ORAL at 13:56

## 2023-09-20 RX ADMIN — ACETAMINOPHEN 650 MG: 325 TABLET, FILM COATED ORAL at 08:51

## 2023-09-20 RX ADMIN — IBUPROFEN 800 MG: 400 TABLET ORAL at 02:06

## 2023-09-20 RX ADMIN — ACETAMINOPHEN 650 MG: 325 TABLET, FILM COATED ORAL at 16:57

## 2023-09-20 ASSESSMENT — ACTIVITIES OF DAILY LIVING (ADL)
ADLS_ACUITY_SCORE: 20
ADLS_ACUITY_SCORE: 21
ADLS_ACUITY_SCORE: 20
ADLS_ACUITY_SCORE: 20
ADLS_ACUITY_SCORE: 21
ADLS_ACUITY_SCORE: 20

## 2023-09-20 NOTE — PROGRESS NOTES
Dr. Bello called to patients bedside for assessment of ongoing variable decelerations with contractions.     -Pitocin is off at this time.  -O2 in place  -IV fluid bolus infusing  -Exaggerated side lying both left and right alternated   -SVE 7-8 cm  -Solorzano draining appropriately       MD with placement of FSE and IUPC. Bloody show per MD SVZACHARY. MD remains at bedside.     Saúl Hartman RN 09/19/23 7:19 PM

## 2023-09-20 NOTE — PLAN OF CARE
Patient is progressing well. Rates current pain 3/10 r/t her back and uterine cramps. Coping well with PRN medication (see MAR) and positioning. Pt is voiding spontaneously and has had 1 BM. She is OOB independently, steady gait. Tending to her own cares. Her fundus is firm, midline, and 2 below the umbilicus. Bleeding is moderate, not passing clots. Attentive to infant cues, bonding well and asking appropriate questions. Breastfeeding independently without issues. VSS, plan of care on going.       Problem: Plan of Care - These are the overarching goals to be used throughout the patient stay.    Goal: Absence of Hospital-Acquired Illness or Injury  Outcome: Progressing  Intervention: Prevent Skin Injury  Recent Flowsheet Documentation  Taken 9/19/2023 2300 by Cecilia Finnegan RN  Body Position: position changed independently     Problem: Plan of Care - These are the overarching goals to be used throughout the patient stay.    Goal: Optimal Comfort and Wellbeing  Outcome: Progressing  Intervention: Provide Person-Centered Care  Recent Flowsheet Documentation  Taken 9/19/2023 2300 by Cecilia Finnegan RN  Trust Relationship/Rapport:   care explained   choices provided   emotional support provided   empathic listening provided   questions answered   questions encouraged   reassurance provided   thoughts/feelings acknowledged     Problem: Plan of Care - These are the overarching goals to be used throughout the patient stay.    Goal: Readiness for Transition of Care  Outcome: Progressing     Problem: Postpartum (Vaginal Delivery)  Goal: Successful Maternal Role Transition  Outcome: Progressing     Problem: Postpartum (Vaginal Delivery)  Goal: Hemostasis  Outcome: Progressing     Problem: Postpartum (Vaginal Delivery)  Goal: Absence of Infection Signs and Symptoms  Outcome: Progressing     Problem: Postpartum (Vaginal Delivery)  Goal: Anesthesia/Sedation Recovery  Outcome: Progressing     Problem: Postpartum (Vaginal  Delivery)  Goal: Optimal Pain Control and Function  Outcome: Progressing     Problem: Postpartum (Vaginal Delivery)  Goal: Effective Urinary Elimination  Outcome: Progressing     Problem: Breastfeeding  Goal: Effective Breastfeeding  Outcome: Progressing

## 2023-09-20 NOTE — LACTATION NOTE
This note was copied from a baby's chart.  INPATIENT LACTATION CONSULT      Consult with Genet and coral regarding breastfeeding. Genet states she notices obvious rooting with a strong latch during feeding sessions.  Rhythmic and aggressive suckling also noted.  Instructed Genet on correct positioning and technique when latching babe on.  Genet is independent with latching babe onto breast.  Minimal assistance required.  Encouraged Genet on the importance of frequent feedings throughout the day (at least 8-12 feedings in a 24 hour period) and skin to skin contact.  Genet demonstrated and states she understands all information given.     Camila Ross RN, IBCLC  Lactation Consultant  [facility]

## 2023-09-20 NOTE — PROGRESS NOTES
Discharge Note      Data:  Genet Jaimes discharged to home at 1800 via ambulation. Accompanied by staff.    Action:  Written discharge/follow-up instructions were provided to  patient . Prescriptions - None ordered for discharge. All belongings sent with patient.    Response:  Patient verbalized understanding of discharge instructions, reason for discharge, and necessary follow-up appointments.

## 2023-09-20 NOTE — PROGRESS NOTES
RN at bedside with pt. Up OOB x1 assistance. Denies dizziness/light-headedness. Steady gait. Voided spontaneously without issue, 800ml. Bleeding is moderate. Will continue to provide assistance while pt is OOB. Fundus is firm, midline, 2cm below umbilicus. Pt c/o discomfort in her ankles, edema is minimal. Bed linens changed, pt assisted with hygiene. Will continue to monitor and intervene as needed.

## 2023-09-20 NOTE — PROGRESS NOTES
" Postpartum Rounding Progress Note    Ms. Jaimes is PPD #1 today after an uncomplicated . She reports feeling well with no acute concerns at this time. She is eager to go home. Her bleeding has slowed down and she does not endorse any clots. Pain has been well controlled with alternating tylenol and ibuprofen. She has been up and ambulating well, without feeling light-headed or dizzy. Tolerating normal diet, has been able to urinate normally and is passing flatus but has not yet had a bowel movement. No concerns for leg pain or calf pain.  She reports her mood is good. We discussed what to expect as she recovers at home including continued, scant lochia, mood fluctuations and uterine cramping, especially with breastfeeding.       Exam  Vitals:  BP Readings from Last 1 Encounters:   23 116/70     Pulse Readings from Last 1 Encounters:   23 75     Wt Readings from Last 1 Encounters:   23 85.3 kg (188 lb)     Ht Readings from Last 1 Encounters:   23 1.689 m (5' 6.5\")     Estimated body mass index is 29.89 kg/m  as calculated from the following:    Height as of this encounter: 1.689 m (5' 6.5\").    Weight as of this encounter: 85.3 kg (188 lb).  Temp Readings from Last 1 Encounters:   23 97.2  F (36.2  C) (Temporal)       General: No acute distress, well-appearing  CV: Normal rate, no pallor  Lungs: Non-labored respirations  Abdominal: Uterine fundus is firm, midline and just below umbilicus  Extremities: Normal movement, mild, symmetric bilateral lower extremity swelling, no sign of erythema or asymmetry. Negative Simón's bilaterally    Assessment & Plan  Ms. Jaimes is a 33 y.o.  s/p  at 40w6d following uncomplicated pregnancy.    #PPD 1  --Meeting milestones appropriately  --Lochia bleeding w/o clots  --Pain adequately controlled with PRNs  --Tolerating regular diet and ambulating well    #IWB  --Baby boy, Kaladin, doing well per " mom  --Breastfeeding    #Disposition  --Continue routine postpartum care  -- She would like to discharge after 24 hours if baby is able.  --Follow up in clinic in 6 weeks    Faith Bello MD on 9/20/2023 at 7:18 AM

## 2023-09-20 NOTE — DISCHARGE SUMMARY
Admit date: 2023  Discharge date: 2023    Admit Dx:   - 33 year old  at 40w6d   - eIOL (late term in 1 day)    Discharge Dx:  - Same as above, s/p     Procedures:  - IOL  -     Admit HPI:  Ms. Genet Jaimes is a   at 40w6d who was admitted for IOL on 2023. Please see her admit H&P for full details of her PMH, PSH, Meds, Allergies and exam on admit.    Hospital course:  Genet Jaimes was admitted to the hospital on 2023 for the above listed indications. Genet's induction was started with pitocin and AROM was performed. She progressed throughout labor well. She had recurrent variables during the late stage of dilation, where amnioinfusion was started. She then quickly progressed to completely dilated and over one contraction pushed to delivery baby boy, Kaladin. Her postpartum course was uncomplicated and she was meeting criteria for discharge on PPD#1.    Discharge Medications:  Current Discharge Medication List        CONTINUE these medications which have NOT CHANGED    Details   Cyanocobalamin (B-12) 1000 MCG TBCR       docusate sodium (COLACE) 50 MG capsule Take 2 capsules (100 mg) by mouth 2 times daily as needed for constipation  Qty: 60 capsule, Refills: 0    Associated Diagnoses: Third trimester pregnancy      famotidine (PEPCID) 20 MG tablet Take 1 tablet (20 mg) by mouth 2 times daily as needed (acid reflux)  Qty: 60 tablet, Refills: 0    Associated Diagnoses: Second trimester pregnancy; Nausea and vomiting in pregnancy      Prenatal Vit-Fe Fumarate-FA (PRENATAL MULTIVITAMIN W/IRON) 27-0.8 MG tablet Take 1 tablet by mouth daily      simethicone (MYLICON) 125 MG chewable tablet Take 1 tablet (125 mg) by mouth 2 times daily  Qty: 30 tablet, Refills: 1    Associated Diagnoses: Third trimester pregnancy           STOP taking these medications       metoclopramide (REGLAN) 10 MG tablet Comments:   Reason for Stopping:         metoclopramide (REGLAN) 10 MG  tablet Comments:   Reason for Stopping:             Discharge/Disposition:  Genet Jaimes was discharged to home in stable condition     Faith Bello MD on 9/20/2023 at 9:18 AM

## 2023-09-20 NOTE — PLAN OF CARE
Goal Outcome Evaluation:    Pt vaginally delivered baby boy yesterday evening. Breastfeeding without concerns. Fundus firm U/1, vaginal bleeding mild to moderate, no clots. Vitals stable. Attentive to baby's needs. Pleasant mood. Plans to discharge home at 24 hours post delivery.       Plan of Care Reviewed With: patient    Overall Patient Progress: improvingOverall Patient Progress: improving

## 2023-09-20 NOTE — PLAN OF CARE
"Goal Outcome Evaluation:    Genet Jaimes is doing well. Vitals are stable: /68   Pulse 70   Temp 96.8  F (36  C)   Resp 14   Ht 1.689 m (5' 6.5\")   Wt 85.3 kg (188 lb)   LMP 2022 (Approximate)   SpO2 99%   Breastfeeding Unknown   BMI 29.89 kg/m      FF, midline and 1 below. Bleeding is moderate with no clots noted. Pain has been well managed with oral analgesics. Voiding without difficulty. Patient IS passing gas. Independent in room. Tolerating a Regular diet and oral rehydration. IV is  infusing with PP Oxytocin. breastfeeding with minimal assistance. Bonding well with . Patient has verbalized understanding of routine cares and warning signs.    Saúl Hartman RN 23 7:16 PM             "

## 2023-09-20 NOTE — ANESTHESIA POSTPROCEDURE EVALUATION
Patient: Genet Jaimes    Procedure: * No procedures listed *       Anesthesia Type:  Epidural    Note:  Disposition: Inpatient   Postop Pain Control: Uneventful            Sign Out: Well controlled pain   PONV: No   Neuro/Psych: Uneventful            Sign Out: Acceptable/Baseline neuro status   Airway/Respiratory: Uneventful            Sign Out: Acceptable/Baseline resp. status   CV/Hemodynamics: Uneventful            Sign Out: Acceptable CV status; No obvious hypovolemia; No obvious fluid overload   Other NRE: NONE   DID A NON-ROUTINE EVENT OCCUR? No    Event details/Postop Comments:  Patient up ambulating in room, able to void without issues. Patient denies fever, chills, headache, or backache. Patient very satisfied with epidural services.            Last vitals:  Vitals:    09/19/23 2301 09/20/23 0416 09/20/23 0820   BP: 124/73 116/70    Pulse: 90 75 78   Resp: 18 16 16   Temp: 97  F (36.1  C) 97.2  F (36.2  C) 97.2  F (36.2  C)   SpO2: 99% 98% 99%       Electronically Signed By: ZACHARY Wilson CRNA  September 20, 2023  1:18 PM

## 2023-10-04 ENCOUNTER — MEDICAL CORRESPONDENCE (OUTPATIENT)
Dept: HEALTH INFORMATION MANAGEMENT | Facility: OTHER | Age: 33
End: 2023-10-04
Payer: COMMERCIAL

## 2023-10-31 ENCOUNTER — PRENATAL OFFICE VISIT (OUTPATIENT)
Dept: OBGYN | Facility: OTHER | Age: 33
End: 2023-10-31
Attending: STUDENT IN AN ORGANIZED HEALTH CARE EDUCATION/TRAINING PROGRAM
Payer: COMMERCIAL

## 2023-10-31 VITALS
HEART RATE: 80 BPM | SYSTOLIC BLOOD PRESSURE: 124 MMHG | WEIGHT: 166.8 LBS | BODY MASS INDEX: 26.52 KG/M2 | DIASTOLIC BLOOD PRESSURE: 80 MMHG

## 2023-10-31 PROCEDURE — 99207 PR POST-PARTUM 6 WK VISIT - GICH ONLY: CPT | Performed by: STUDENT IN AN ORGANIZED HEALTH CARE EDUCATION/TRAINING PROGRAM

## 2023-10-31 ASSESSMENT — EDINBURGH POSTNATAL DEPRESSION SCALE (EPDS)
I HAVE FELT SAD OR MISERABLE: NO, NOT AT ALL
I HAVE BEEN SO UNHAPPY THAT I HAVE HAD DIFFICULTY SLEEPING: NOT VERY OFTEN
I HAVE BEEN ANXIOUS OR WORRIED FOR NO GOOD REASON: NO, NOT AT ALL
I HAVE BEEN ABLE TO LAUGH AND SEE THE FUNNY SIDE OF THINGS: AS MUCH AS I ALWAYS COULD
THINGS HAVE BEEN GETTING ON TOP OF ME: NO, MOST OF THE TIME I HAVE COPED QUITE WELL
I HAVE FELT SCARED OR PANICKY FOR NO GOOD REASON: NO, NOT MUCH
I HAVE LOOKED FORWARD WITH ENJOYMENT TO THINGS: AS MUCH AS I EVER DID
I HAVE BEEN SO UNHAPPY THAT I HAVE BEEN CRYING: NO, NEVER
I HAVE BEEN ABLE TO LAUGH AND SEE THE FUNNY SIDE OF THINGS: AS MUCH AS I ALWAYS COULD
I HAVE LOOKED FORWARD WITH ENJOYMENT TO THINGS: AS MUCH AS I EVER DID
I HAVE BEEN ANXIOUS OR WORRIED FOR NO GOOD REASON: NO, NOT AT ALL
I HAVE BLAMED MYSELF UNNECESSARILY WHEN THINGS WENT WRONG: YES, SOME OF THE TIME
I HAVE BEEN SO UNHAPPY THAT I HAVE HAD DIFFICULTY SLEEPING: NOT VERY OFTEN
I HAVE BEEN SO UNHAPPY THAT I HAVE BEEN CRYING: NO, NEVER
THINGS HAVE BEEN GETTING ON TOP OF ME: NO, MOST OF THE TIME I HAVE COPED QUITE WELL
I HAVE FELT SCARED OR PANICKY FOR NO GOOD REASON: NO, NOT MUCH
THE THOUGHT OF HARMING MYSELF HAS OCCURRED TO ME: NEVER
THE THOUGHT OF HARMING MYSELF HAS OCCURRED TO ME: NEVER
TOTAL SCORE: 5
I HAVE BLAMED MYSELF UNNECESSARILY WHEN THINGS WENT WRONG: YES, SOME OF THE TIME
I HAVE FELT SAD OR MISERABLE: NO, NOT AT ALL

## 2023-10-31 ASSESSMENT — PATIENT HEALTH QUESTIONNAIRE - PHQ9
SUM OF ALL RESPONSES TO PHQ QUESTIONS 1-9: 3
SUM OF ALL RESPONSES TO PHQ QUESTIONS 1-9: 3
10. IF YOU CHECKED OFF ANY PROBLEMS, HOW DIFFICULT HAVE THESE PROBLEMS MADE IT FOR YOU TO DO YOUR WORK, TAKE CARE OF THINGS AT HOME, OR GET ALONG WITH OTHER PEOPLE: NOT DIFFICULT AT ALL

## 2023-10-31 NOTE — NURSING NOTE
Pt presents to clinic today for 6 week post partum check.      Medication Reconciliation: complete  Angelic Gross LPN

## 2023-10-31 NOTE — PROGRESS NOTES
Postpartum Office Visit    S: Ms. Jaimes is a  who is s/p an  on 2023. Her delivery was uncomplicated. Her postpartum course in the hospital was also uncomplicated. She is feeling well today. She has no acute concerns. She notes her mood has been good, denies depression or any concern for harm to herself or others. She has been breast feeding the baby. She has not yet been sexually active. Not interested in contraception.    O:   /80   Pulse 80   Wt 75.7 kg (166 lb 12.8 oz)   LMP 2022 (Approximate)   Breastfeeding Yes   BMI 26.52 kg/m      Gen: Well-appearing, NAD    Assessment:  Ms. Genet Jaimes is a 33 year old yo now  who is s/p  on 2023. She is doing well in the postpartum period.    Plan:  # Routine postpartum care  -- Feeling well, no concerns  -- breast feeding  -- Contraception goals: declines  -- Mood stable and doing well    Return to clinic with any questions or concerns    Faith Bello MD on 10/31/2023 at 11:39 AM    Answers submitted by the patient for this visit:  Patient Health Questionnaire (Submitted on 10/31/2023)  If you checked off any problems, how difficult have these problems made it for you to do your work, take care of things at home, or get along with other people?: Not difficult at all  PHQ9 TOTAL SCORE: 3

## 2023-11-27 ENCOUNTER — MEDICAL CORRESPONDENCE (OUTPATIENT)
Dept: HEALTH INFORMATION MANAGEMENT | Facility: CLINIC | Age: 33
End: 2023-11-27
Payer: COMMERCIAL

## 2023-11-29 ENCOUNTER — MEDICAL CORRESPONDENCE (OUTPATIENT)
Dept: HEALTH INFORMATION MANAGEMENT | Facility: OTHER | Age: 33
End: 2023-11-29
Payer: COMMERCIAL

## 2024-01-08 ENCOUNTER — MEDICAL CORRESPONDENCE (OUTPATIENT)
Dept: HEALTH INFORMATION MANAGEMENT | Facility: OTHER | Age: 34
End: 2024-01-08
Payer: COMMERCIAL

## 2024-03-20 ENCOUNTER — MEDICAL CORRESPONDENCE (OUTPATIENT)
Dept: HEALTH INFORMATION MANAGEMENT | Facility: OTHER | Age: 34
End: 2024-03-20
Payer: COMMERCIAL

## 2025-05-17 ENCOUNTER — HEALTH MAINTENANCE LETTER (OUTPATIENT)
Age: 35
End: 2025-05-17

## (undated) RX ORDER — ACETAMINOPHEN 325 MG/1
TABLET ORAL
Status: DISPENSED
Start: 2021-01-09

## (undated) RX ORDER — IBUPROFEN 200 MG
TABLET ORAL
Status: DISPENSED
Start: 2021-01-09